# Patient Record
Sex: MALE | Race: WHITE | Employment: OTHER | URBAN - NONMETROPOLITAN AREA
[De-identification: names, ages, dates, MRNs, and addresses within clinical notes are randomized per-mention and may not be internally consistent; named-entity substitution may affect disease eponyms.]

---

## 2021-09-07 ENCOUNTER — HOSPITAL ENCOUNTER (EMERGENCY)
Age: 36
Discharge: HOME | End: 2021-09-07
Payer: MEDICARE

## 2021-09-07 VITALS
RESPIRATION RATE: 17 BRPM | DIASTOLIC BLOOD PRESSURE: 82 MMHG | WEIGHT: 150 LBS | TEMPERATURE: 98.2 F | HEART RATE: 91 BPM | SYSTOLIC BLOOD PRESSURE: 138 MMHG | OXYGEN SATURATION: 99 % | BODY MASS INDEX: 22.22 KG/M2 | HEIGHT: 69 IN

## 2021-09-07 DIAGNOSIS — F25.1 SCHIZOAFFECTIVE DISORDER, DEPRESSIVE TYPE (HCC): Primary | ICD-10-CM

## 2021-09-07 LAB
ACETAMINOPHEN LEVEL: < 5 UG/ML (ref 0–20)
ALBUMIN SERPL-MCNC: 5.2 G/DL (ref 3.5–5.1)
ALP BLD-CCNC: 84 U/L (ref 38–126)
ALT SERPL-CCNC: 29 U/L (ref 11–66)
AMPHETAMINE+METHAMPHETAMINE URINE SCREEN: NEGATIVE
ANION GAP SERPL CALCULATED.3IONS-SCNC: 10 MEQ/L (ref 8–16)
AST SERPL-CCNC: 36 U/L (ref 5–40)
BARBITURATE QUANTITATIVE URINE: NEGATIVE
BASOPHILS # BLD: 0.7 %
BASOPHILS ABSOLUTE: 0.1 THOU/MM3 (ref 0–0.1)
BENZODIAZEPINE QUANTITATIVE URINE: NEGATIVE
BILIRUB SERPL-MCNC: 0.6 MG/DL (ref 0.3–1.2)
BILIRUBIN DIRECT: < 0.2 MG/DL (ref 0–0.3)
BILIRUBIN URINE: NEGATIVE
BLOOD, URINE: NEGATIVE
BUN BLDV-MCNC: 17 MG/DL (ref 7–22)
CALCIUM SERPL-MCNC: 9.7 MG/DL (ref 8.5–10.5)
CANNABINOID QUANTITATIVE URINE: NEGATIVE
CHARACTER, URINE: CLEAR
CHLORIDE BLD-SCNC: 101 MEQ/L (ref 98–111)
CO2: 28 MEQ/L (ref 23–33)
COCAINE METABOLITE QUANTITATIVE URINE: NEGATIVE
COLOR: ABNORMAL
CREAT SERPL-MCNC: 1 MG/DL (ref 0.4–1.2)
EOSINOPHIL # BLD: 1.8 %
EOSINOPHILS ABSOLUTE: 0.2 THOU/MM3 (ref 0–0.4)
ERYTHROCYTE [DISTWIDTH] IN BLOOD BY AUTOMATED COUNT: 12.1 % (ref 11.5–14.5)
ERYTHROCYTE [DISTWIDTH] IN BLOOD BY AUTOMATED COUNT: 39.7 FL (ref 35–45)
ETHYL ALCOHOL, SERUM: < 0.01 %
GFR SERPL CREATININE-BSD FRML MDRD: 84 ML/MIN/1.73M2
GLUCOSE BLD-MCNC: 100 MG/DL (ref 70–108)
GLUCOSE URINE: NEGATIVE MG/DL
HCT VFR BLD CALC: 47.7 % (ref 42–52)
HEMOGLOBIN: 16.5 GM/DL (ref 14–18)
IMMATURE GRANS (ABS): 0.02 THOU/MM3 (ref 0–0.07)
IMMATURE GRANULOCYTES: 0.2 %
KETONES, URINE: 40
LEUKOCYTE ESTERASE, URINE: NEGATIVE
LYMPHOCYTES # BLD: 12.6 %
LYMPHOCYTES ABSOLUTE: 1.3 THOU/MM3 (ref 1–4.8)
MCH RBC QN AUTO: 31.1 PG (ref 26–33)
MCHC RBC AUTO-ENTMCNC: 34.6 GM/DL (ref 32.2–35.5)
MCV RBC AUTO: 89.8 FL (ref 80–94)
MONOCYTES # BLD: 5.3 %
MONOCYTES ABSOLUTE: 0.5 THOU/MM3 (ref 0.4–1.3)
NITRITE, URINE: NEGATIVE
NUCLEATED RED BLOOD CELLS: 0 /100 WBC
OPIATES, URINE: NEGATIVE
OSMOLALITY CALCULATION: 279.2 MOSMOL/KG (ref 275–300)
OXYCODONE: NEGATIVE
PH UA: 5 (ref 5–9)
PHENCYCLIDINE QUANTITATIVE URINE: NEGATIVE
PLATELET # BLD: 205 THOU/MM3 (ref 130–400)
PMV BLD AUTO: 10 FL (ref 9.4–12.4)
POTASSIUM SERPL-SCNC: 3.8 MEQ/L (ref 3.5–5.2)
PROTEIN UA: NEGATIVE
RBC # BLD: 5.31 MILL/MM3 (ref 4.7–6.1)
SALICYLATE, SERUM: < 0.3 MG/DL (ref 2–10)
SEG NEUTROPHILS: 79.4 %
SEGMENTED NEUTROPHILS ABSOLUTE COUNT: 7.9 THOU/MM3 (ref 1.8–7.7)
SODIUM BLD-SCNC: 139 MEQ/L (ref 135–145)
SPECIFIC GRAVITY, URINE: 1.02 (ref 1–1.03)
TOTAL PROTEIN: 7.9 G/DL (ref 6.1–8)
TSH SERPL DL<=0.05 MIU/L-ACNC: 2.9 UIU/ML (ref 0.4–4.2)
UROBILINOGEN, URINE: 0.2 EU/DL (ref 0–1)
WBC # BLD: 10 THOU/MM3 (ref 4.8–10.8)

## 2021-09-07 PROCEDURE — 80179 DRUG ASSAY SALICYLATE: CPT

## 2021-09-07 PROCEDURE — 80307 DRUG TEST PRSMV CHEM ANLYZR: CPT

## 2021-09-07 PROCEDURE — 80053 COMPREHEN METABOLIC PANEL: CPT

## 2021-09-07 PROCEDURE — 84443 ASSAY THYROID STIM HORMONE: CPT

## 2021-09-07 PROCEDURE — 82248 BILIRUBIN DIRECT: CPT

## 2021-09-07 PROCEDURE — 82077 ASSAY SPEC XCP UR&BREATH IA: CPT

## 2021-09-07 PROCEDURE — 85025 COMPLETE CBC W/AUTO DIFF WBC: CPT

## 2021-09-07 PROCEDURE — 99285 EMERGENCY DEPT VISIT HI MDM: CPT

## 2021-09-07 PROCEDURE — 36415 COLL VENOUS BLD VENIPUNCTURE: CPT

## 2021-09-07 PROCEDURE — 80143 DRUG ASSAY ACETAMINOPHEN: CPT

## 2021-09-07 PROCEDURE — 81003 URINALYSIS AUTO W/O SCOPE: CPT

## 2021-09-07 ASSESSMENT — SLEEP AND FATIGUE QUESTIONNAIRES
DIFFICULTY FALLING ASLEEP: YES
DIFFICULTY ARISING: NO
RESTFUL SLEEP: NO
DIFFICULTY STAYING ASLEEP: YES
DO YOU HAVE DIFFICULTY SLEEPING: YES
SLEEP PATTERN: INSOMNIA

## 2021-09-07 NOTE — PROGRESS NOTES
Provisional Diagnosis:    Schizoaffective Disorder    Risk, Psychosocial and Contextual Factors:      Current  Treatment:  Dr. Isa Kate      Present Suicidal Behavior:      Verbal:    Denies        Attempt:   Denies    Access to Weapons:   Denies    Current Suicide Risk: Low, Moderate or High:    Low    Past Suicidal Behavior:       Verbal:    Denies    Attempt:   Denies     Self-Injurious/Self-Mutilation:  Patient reports hitting chair on head 'years ago'    Traumatic Event Within Past 2 Weeks:   Denies    Current Abuse:   Denies     Legal:     Denies    Violence:    Denies    Protective Factors:    Good Support    Housing:       Lives in [facility]    CPAP/Oxygen/Ambulation Difficulties:     Basic Vital Signs Normal?: Check with Patients Nurse prior to Calling Psychiatry    Critical Labs?: Check with Patients Nurse prior to Calling Psychiatry    Clinical Summary:      Patient is a 39year old male who presents to the ED voluntarily. Patient reports 'I saw a demon above my bed'. Patient reports insomnia due to visual hallucinations. Patient reports he has had verbal and visual hallucinations his 'entire life'. Patient denies recent depression. Patient denies suicidal ideation or plan. Patient does follow up outpatient for treatment. Patient compliant with prescribed medication. Patient previously admitted to [facility] on 6/26/16. Homicidal thoughts and/or plans denied. No delusions noted. AOD denied. Patient alert and oriented x4. Patient cooperative throughout assessment. Level of Care Disposition:      Consulted with medical provider. Patient is medically cleared. No psych consult needed. Patient to be discharged to [facility].

## 2021-09-07 NOTE — ED NOTES
Bed: 027A  Expected date: 9/7/21  Expected time:   Means of arrival: [facility]  Comments:     Leonel Hughes  09/07/21 0227

## 2021-09-07 NOTE — ED NOTES
Pt to ED via EMS from [facility]. Per EMS pt has been non-compliant with their medications. Pt reports they have been Hallucinating and has \"demons\" in their head. Pt reports they are not sure what the demons are telling them. Pt denies feeling suicidal or homicidal. Pt VSS. Patient placed in safe room that is ligature resistant with continuous monitoring in place. Provider notified, requested an assessment by behavioral health . Patient belongings secured in a locked lockers outside of the room. Explained suicide prevention precautions to the patient including constant observer.       Owen Rodas RN  09/07/21 7053

## 2021-09-13 ASSESSMENT — ENCOUNTER SYMPTOMS
VOMITING: 0
BACK PAIN: 0
EYE REDNESS: 0
CHEST TIGHTNESS: 0
RHINORRHEA: 0
COUGH: 0
NAUSEA: 0
ABDOMINAL PAIN: 0

## 2021-09-13 NOTE — ED PROVIDER NOTES
[facility] Emergency Department    CHIEF COMPLAINT       Chief Complaint   Patient presents with    Psychiatric Evaluation    Hallucinations       Nurses Notes reviewed and I agree except as noted in the HPI. HISTORY OF PRESENT ILLNESS    Santana James manuel 39 y.o. male who presents to the ED for psychiatric evaluation. The patient has been having hallucinations. The patient resides at [facility].  They state that the patient has been questionably compliant with medications. He is hallucinating that there are demons in his head that tell him to do things but the patient denies feeling suicidal or homicidal.          HPI was provided by the patient    REVIEW OF SYSTEMS     Review of Systems   Constitutional: Negative for chills, fatigue and fever. HENT: Negative for congestion, ear discharge, ear pain, postnasal drip and rhinorrhea. Eyes: Negative for redness. Respiratory: Negative for cough and chest tightness. Cardiovascular: Negative for chest pain and leg swelling. Gastrointestinal: Negative for abdominal pain, nausea and vomiting. Genitourinary: Negative for difficulty urinating, dysuria, enuresis, flank pain and hematuria. Musculoskeletal: Negative for back pain and joint swelling. Skin: Negative for rash. Neurological: Negative for dizziness, light-headedness, numbness and headaches. Psychiatric/Behavioral: Positive for agitation, behavioral problems, hallucinations and sleep disturbance. Negative for confusion and suicidal ideas. All other systems negative except as noted. PAST MEDICAL HISTORY     Past Medical History:   Diagnosis Date    Depression     Hallucination     Paranoid behavior ([facility]     Schizoaffective disorder ([facility]        SURGICALHISTORY      has a past surgical history that includes Nose surgery and orthopedic surgery.     CURRENT MEDICATIONS       Discharge Medication List as of 9/7/2021  4:13 AM      CONTINUE these medications which have NOT CHANGED    Details   lurasidone (LATUDA) 80 MG TABS tablet Take 1 tablet by mouth 2 times daily, Disp-30 tablet, R-1      Mouthwashes (BIOTENE/CALCIUM PBF) LIQD Take 15 mLs by mouth 3 times daily as needed (dry mouth), Disp-1 Bottle, R-1      traZODone (DESYREL) 50 MG tablet Take 1 tablet by mouth nightly as needed for Sleep, Disp-30 tablet, R-0      Multiple Vitamins-Minerals (THERAPEUTIC MULTIVITAMIN-MINERALS) tablet Take 1 tablet by mouth daily      LORazepam (ATIVAN) 0.5 MG tablet Take 1 mg by mouth 2 times daily as needed for Anxiety       benztropine (COGENTIN) 2 MG tablet Take 2 mg by mouth 2 times daily Indications: Take with Latuda      PARoxetine HCl (PAXIL PO) Take  by mouth. Iloperidone (FANAPT PO) Take  by mouth. ALLERGIES     is allergic to zyprexa [olanzapine]. FAMILY HISTORY     He indicated that his mother is alive. He indicated that his father is alive. He indicated that the status of his maternal grandmother is unknown. He indicated that the status of his maternal grandfather is unknown. He indicated that the status of his paternal grandmother is unknown. He indicated that the status of his paternal grandfather is unknown.   family history includes Cancer in his maternal grandmother and paternal grandmother; Depression in his maternal grandmother; Heart Disease in his paternal grandfather; High Cholesterol in his maternal grandfather.     SOCIAL HISTORY       Social History     Socioeconomic History    Marital status: Single     Spouse name: Not on file    Number of children: Not on file    Years of education: Not on file    Highest education level: Not on file   Occupational History    Not on file   Tobacco Use    Smoking status: Former Smoker     Quit date: 2003     Years since quittin.5    Smokeless tobacco: Never Used   Vaping Use    Vaping Use: Never used   Substance and Sexual Activity    Alcohol use: No    Drug use: No    Sexual activity: Not Currently   Other Topics Concern    Not on file   Social History Narrative    ** Merged History Encounter **          Social Determinants of Health     Financial Resource Strain:     Difficulty of Paying Living Expenses:    Food Insecurity:     Worried About Running Out of Food in the Last Year:     Ran Out of Food in the Last Year:    Transportation Needs:     Lack of Transportation (Medical):  Lack of Transportation (Non-Medical):    Physical Activity:     Days of Exercise per Week:     Minutes of Exercise per Session:    Stress:     Feeling of Stress :    Social Connections:     Frequency of Communication with Friends and Family:     Frequency of Social Gatherings with Friends and Family:     Attends [employer] Services:     Active Member of Clubs or Organizations:     Attends Club or Organization Meetings:     Marital Status:    Intimate Partner Violence:     Fear of Current or Ex-Partner:     Emotionally Abused:     Physically Abused:     Sexually Abused:        PHYSICAL EXAM     INITIAL VITALS:  height is 5' 9\" (1.753 m) and weight is 150 lb (68 kg). His oral temperature is 98.2 °F (36.8 °C). His blood pressure is 138/82 and his pulse is 91. His respiration is 17 and oxygen saturation is 99%. Physical Exam  Constitutional:       Appearance: Normal appearance. He is well-developed. He is not ill-appearing. HENT:      Head: Normocephalic and atraumatic. Nose: Nose normal.      Mouth/Throat:      Mouth: Mucous membranes are moist.      Pharynx: Oropharynx is clear. Eyes:      Conjunctiva/sclera: Conjunctivae normal.   Cardiovascular:      Rate and Rhythm: Normal rate. Pulses: Normal pulses. Pulmonary:      Effort: Pulmonary effort is normal.   Abdominal:      Palpations: Abdomen is soft. Musculoskeletal:         General: Normal range of motion. Cervical back: Normal range of motion. Skin:     General: Skin is warm and dry.       Capillary Refill: Capillary refill METABOLIC PANEL   ETHANOL   TSH WITHOUT REFLEX   URINE DRUG SCREEN   ANION GAP   OSMOLALITY       EMERGENCY DEPARTMENT COURSE:   Vitals:    Vitals:    09/07/21 0233   BP: 138/82   Pulse: 91   Resp: 17   Temp: 98.2 °F (36.8 °C)   TempSrc: Oral   SpO2: 99%   Weight: 150 lb (68 kg)   Height: 5' 9\" (1.753 m)            Plan:    Order the following: Labs for medical clearance  Medications: none  Goal: Dispo per psych                         MDM    The patient was seen and evaluated within the ED today for the evaluation of hallucinations. Physical exam revealed no significant abnormalities or concerns. I completed a medical evaluation of the patient and ordered appropriate labs which were unremarkable. [facility] and social work completed a full psychiatric evaluation of the patient and determined that he met  discharge criteria. I medically cleared the patient. [facility] and social work's noted should be consulted for the psychiatric evaluation and reason for discharge. Medications - No data to display      Patient was seen independently by myself. The patient's final impression and disposition and plan was determined by myself. Strict return precautions and follow up instructions were discussed with the patient prior to discharge, with which the patient agrees. Physical assessment findings, diagnostic testing(s) if applicable, and vital signs reviewed with patient/patient representative. Questions answered. Medications asdirected, including OTC medications for supportive care. Education provided on medications. Differential diagnosis(s) discussed with patient/patient representative. Home care/self care instructions reviewed withpatient/patient representative. Patient is to follow-up with family care provider in 2-3 days if no improvement. Patient is to go to the emergency department if symptoms worsen.   Patient/patient representative isaware of care plan, questions answered, verbalizes understanding and is in

## 2022-02-02 ENCOUNTER — HOSPITAL ENCOUNTER (INPATIENT)
Age: 37
LOS: 7 days | Discharge: HOME | DRG: 885 | End: 2022-02-11
Attending: EMERGENCY MEDICINE | Admitting: PSYCHIATRY & NEUROLOGY
Payer: MEDICARE

## 2022-02-02 DIAGNOSIS — F25.1 SCHIZOAFFECTIVE DISORDER, DEPRESSIVE TYPE (HCC): Primary | ICD-10-CM

## 2022-02-02 LAB
ACETAMINOPHEN LEVEL: < 5 UG/ML (ref 0–20)
AMPHETAMINE+METHAMPHETAMINE URINE SCREEN: NEGATIVE
ANION GAP SERPL CALCULATED.3IONS-SCNC: 12 MEQ/L (ref 8–16)
BARBITURATE QUANTITATIVE URINE: NEGATIVE
BASOPHILS # BLD: 1.3 %
BASOPHILS ABSOLUTE: 0.1 THOU/MM3 (ref 0–0.1)
BENZODIAZEPINE QUANTITATIVE URINE: NEGATIVE
BUN BLDV-MCNC: 21 MG/DL (ref 7–22)
CALCIUM SERPL-MCNC: 9.3 MG/DL (ref 8.5–10.5)
CANNABINOID QUANTITATIVE URINE: NEGATIVE
CHLORIDE BLD-SCNC: 100 MEQ/L (ref 98–111)
CO2: 26 MEQ/L (ref 23–33)
COCAINE METABOLITE QUANTITATIVE URINE: NEGATIVE
CREAT SERPL-MCNC: 0.9 MG/DL (ref 0.4–1.2)
EOSINOPHIL # BLD: 3.3 %
EOSINOPHILS ABSOLUTE: 0.2 THOU/MM3 (ref 0–0.4)
ERYTHROCYTE [DISTWIDTH] IN BLOOD BY AUTOMATED COUNT: 12.3 % (ref 11.5–14.5)
ERYTHROCYTE [DISTWIDTH] IN BLOOD BY AUTOMATED COUNT: 40.1 FL (ref 35–45)
ETHYL ALCOHOL, SERUM: < 0.01 %
GFR SERPL CREATININE-BSD FRML MDRD: > 90 ML/MIN/1.73M2
GLUCOSE BLD-MCNC: 114 MG/DL (ref 70–108)
HCT VFR BLD CALC: 43.2 % (ref 42–52)
HEMOGLOBIN: 15 GM/DL (ref 14–18)
IMMATURE GRANS (ABS): 0.02 THOU/MM3 (ref 0–0.07)
IMMATURE GRANULOCYTES: 0.3 %
LYMPHOCYTES # BLD: 16.1 %
LYMPHOCYTES ABSOLUTE: 1 THOU/MM3 (ref 1–4.8)
MCH RBC QN AUTO: 31.3 PG (ref 26–33)
MCHC RBC AUTO-ENTMCNC: 34.7 GM/DL (ref 32.2–35.5)
MCV RBC AUTO: 90.2 FL (ref 80–94)
MONOCYTES # BLD: 7.7 %
MONOCYTES ABSOLUTE: 0.5 THOU/MM3 (ref 0.4–1.3)
NUCLEATED RED BLOOD CELLS: 0 /100 WBC
OPIATES, URINE: NEGATIVE
OSMOLALITY CALCULATION: 279.5 MOSMOL/KG (ref 275–300)
OXYCODONE: NEGATIVE
PHENCYCLIDINE QUANTITATIVE URINE: NEGATIVE
PLATELET # BLD: 210 THOU/MM3 (ref 130–400)
PMV BLD AUTO: 9.9 FL (ref 9.4–12.4)
POTASSIUM REFLEX MAGNESIUM: 4.1 MEQ/L (ref 3.5–5.2)
RBC # BLD: 4.79 MILL/MM3 (ref 4.7–6.1)
SALICYLATE, SERUM: < 0.3 MG/DL (ref 2–10)
SARS-COV-2, NAAT: NOT  DETECTED
SEG NEUTROPHILS: 71.3 %
SEGMENTED NEUTROPHILS ABSOLUTE COUNT: 4.6 THOU/MM3 (ref 1.8–7.7)
SODIUM BLD-SCNC: 138 MEQ/L (ref 135–145)
WBC # BLD: 6.4 THOU/MM3 (ref 4.8–10.8)

## 2022-02-02 PROCEDURE — 82077 ASSAY SPEC XCP UR&BREATH IA: CPT

## 2022-02-02 PROCEDURE — 87635 SARS-COV-2 COVID-19 AMP PRB: CPT

## 2022-02-02 PROCEDURE — 80048 BASIC METABOLIC PNL TOTAL CA: CPT

## 2022-02-02 PROCEDURE — 80143 DRUG ASSAY ACETAMINOPHEN: CPT

## 2022-02-02 PROCEDURE — 85025 COMPLETE CBC W/AUTO DIFF WBC: CPT

## 2022-02-02 PROCEDURE — 80307 DRUG TEST PRSMV CHEM ANLYZR: CPT

## 2022-02-02 PROCEDURE — 36415 COLL VENOUS BLD VENIPUNCTURE: CPT

## 2022-02-02 PROCEDURE — 99285 EMERGENCY DEPT VISIT HI MDM: CPT

## 2022-02-02 PROCEDURE — 80179 DRUG ASSAY SALICYLATE: CPT

## 2022-02-02 ASSESSMENT — SLEEP AND FATIGUE QUESTIONNAIRES
DIFFICULTY STAYING ASLEEP: YES
DO YOU HAVE DIFFICULTY SLEEPING: YES
RESTFUL SLEEP: NO
DIFFICULTY FALLING ASLEEP: YES
AVERAGE NUMBER OF SLEEP HOURS: 5
SLEEP PATTERN: DIFFICULTY FALLING ASLEEP;DISTURBED/INTERRUPTED SLEEP;INSOMNIA
DO YOU USE A SLEEP AID: YES
DIFFICULTY ARISING: NO

## 2022-02-02 ASSESSMENT — PATIENT HEALTH QUESTIONNAIRE - PHQ9: SUM OF ALL RESPONSES TO PHQ QUESTIONS 1-9: 17

## 2022-02-02 NOTE — ED TRIAGE NOTES
Pt presents to the ED via [facility] for a probate. Pt states that he feels like he is being controlled by a demon. Pt states he has felt this way since he was 11. Pt asking for genetic testing. Pt is cooperative. Pt denies suicidal and homicidal thoughts.

## 2022-02-02 NOTE — ED PROVIDER NOTES
[facility]  Emergency Department  Faculty Sign-Out Addendum     Care of Alfredo Acuna was assumed from previous attending and is being seen for Other (probate)  . The patient's initial evaluation and plan have been discussed with the prior provider who initially evaluated the patient. EMERGENCY DEPARTMENT COURSE / MEDICAL DECISION MAKING       MEDICATIONS GIVEN:  No orders of the defined types were placed in this encounter. LABS / RADIOLOGY:     Labs Reviewed   BASIC METABOLIC PANEL W/ REFLEX TO MG FOR LOW K - Abnormal; Notable for the following components:       Result Value    Glucose 114 (*)     All other components within normal limits   SALICYLATE LEVEL - Abnormal; Notable for the following components:    Salicylate, Serum < 0.3 (*)     All other components within normal limits   COVID-19, RAPID   CBC WITH AUTO DIFFERENTIAL   ETHANOL   ACETAMINOPHEN LEVEL   URINE DRUG SCREEN   ANION GAP   GLOMERULAR FILTRATION RATE, ESTIMATED   OSMOLALITY       No results found. RECENT VITALS:     Temp: 98.5 °F (36.9 °C),  Pulse: 87, Resp: 18, BP: (!) 150/108, SpO2: 100 %    This patient is a 44 y.o. Male with acute psychosis awaiting psychiatric placement. OUTSTANDING TASKS / RECOMMENDATIONS    1. Awaiting [facility] evaluation as patient was probated. FINAL DISPOSITION AND ED COURSE     On my examination, resting comfortably no acute distress    HEENT: WNL  Lungs: Clear and equal bilaterally. No increased work of breathing or respiratory distress. Heart: Rate and rhythm regular, no murmurs clicks or gallops  Abdomen: Soft, nondistended, nontender   Lower extremities: no edema, negative Homans bilaterally  Neuro: Awake and alert, no lateralizing deficits, cranial nerves II through XII grossly intact bilaterally    ED COURSE   ED Course as of 02/04/22 1426   Wed Feb 02, 2022   1834 The patient was seen and evaluated within the ED today for the evaluation of psychosis.  He was previously evaluated by my colleague Dr. Zack Rangel. On my assessment his physical exam revealed no significant abnormalities or concerns. I completed a medical evaluation of the patient and ordered appropriate labs which were unremarkable. [facility] completed a full psychiatric evaluation of the patient and determined that he met inpatient criteria. There are no current contraindications for psychiatric disposition. [DD]   u Feb 03, 2022   2307 Patient sign-out to me per my colleague. He is awaiting transport to psych facility  [DD]   Fri Feb 04, 2022   0700 No interventions required Overnight. Patient signed out to my colleague Dr. Zack Rangel. [DD]      ED Course User Index  [DD] Nellene Kayser, DO         Clinical Impression    No diagnosis found. Psychosis  FINAL DISPOSITION    DISPOSITION    Pending at signout to my colleague at shift change. PATIENT REFERRED TO:  No follow-up provider specified.     DISCHARGE MEDICATIONS:  New Prescriptions    No medications on file              (Please note that portions of this note were completed with a voice recognition program.  Efforts were made to edit the dictations but occasionally words are mis-transcribed.)      [address] [place] [address]  Attending Emergency Physician  [address] [facility] [address] [address] EMERGENCY DEPT      Nellene Kayser, DO  02/02/22 [address] [facility], DO  02/04/22 [facility]6

## 2022-02-02 NOTE — ED NOTES
Bed: 022A  Expected date:   Expected time:   Means of arrival:   Comments:     Bandar Mccarthy RN  02/02/22 6740

## 2022-02-02 NOTE — PROGRESS NOTES
Call to [facility], spoke with Arina Figueroa. Dorie Wild states assessment was already completed while patient was at [facility]. Caryl placed call to [facility], 12 bed waiting list. Dorie Wild will fax completed assessment to [facility]. [facility] to complete full assessment for psychiatry.

## 2022-02-02 NOTE — ED NOTES
Pt resting on cot.  ED sitter at bedside     Guero Francis, [address] [facility]  02/02/22 3574

## 2022-02-02 NOTE — ED PROVIDER NOTES
[facility] Adult Psych 4E      CHIEF COMPLAINT       Chief Complaint   Patient presents with    Other     probate       Nurses Notes reviewed and I agree except as noted in the HPI. HISTORY OF PRESENT ILLNESS    Loc Sanches is a 44 y.o. male who presents with complaint of schizophrenia, not taking his medications. Patient brought in by [facility] office as probate, patient is medically cleared and evaluated by [facility] psychiatry. Others patient has no complaints, is aware that he is not taking his medications, reporting auditory hallucinations. REVIEW OF SYSTEMS      Review of Systems   Constitutional: Negative for fever, chills, diaphoresis and fatigue. HENT: Negative for congestion, drooling, facial swelling and sore throat. Eyes: Negative for photophobia, pain and discharge. Respiratory: Negative for cough, shortness of breath, wheezing and stridor. Cardiovascular: Negative for chest pain, palpitations and leg swelling. Gastrointestinal: Negative for abdominal pain, blood in stool and abdominal distention. Genitourinary: Negative for dysuria, urgency, hematuria and difficulty urinating. Musculoskeletal: Negative for gait problem, neck pain and neck stiffness. Allergic/Immunologic: Negative for environmental allergies, food allergies and immunocompromised state. Skin; No rash, No itching  Neurological: Negative for seizures, weakness and numbness. Psychiatric/Behavioral:Pos for hallucinations, confusion and agitation. PAST MEDICAL HISTORY    has a past medical history of Depression, Hallucination, Paranoid behavior ([address] and Schizoaffective disorder ([address] SURGICAL HISTORY      has a past surgical history that includes Nose surgery and orthopedic surgery.     CURRENT MEDICATIONS       Discharge Medication List as of 2/11/2022  1:46 PM      CONTINUE these medications which have NOT CHANGED    Details   Mouthwashes (BIOTENE/CALCIUM PBF) LIQD Take 15 mLs by mouth 3 times daily as needed (dry mouth), Disp-1 Bottle, R-1      Multiple Vitamins-Minerals (THERAPEUTIC MULTIVITAMIN-MINERALS) tablet Take 1 tablet by mouth daily             ALLERGIES     is allergic to zyprexa [olanzapine]. FAMILY HISTORY     He indicated that his mother is alive. He indicated that his father is alive. He indicated that the status of his maternal grandmother is unknown. He indicated that the status of his maternal grandfather is unknown. He indicated that the status of his paternal grandmother is unknown. He indicated that the status of his paternal grandfather is unknown.   family history includes Cancer in his maternal grandmother and paternal grandmother; Depression in his maternal grandmother; Heart Disease in his paternal grandfather; High Cholesterol in his maternal grandfather. SOCIAL HISTORY      reports that he quit smoking about 18 years ago. He has never used smokeless tobacco. He reports that he does not drink alcohol and does not use drugs. PHYSICAL EXAM     INITIAL VITALS:  height is 5' 9\" (1.753 m) and weight is 150 lb (68 kg). His tympanic temperature is 97.7 °F (36.5 °C). His blood pressure is 131/82 and his pulse is 89. His respiration is 16 and oxygen saturation is 96%. Physical Exam   Constitutional:  well-developed and well-nourished. HENT: Head: Normocephalic, atraumatic, Bilateral external ears normal, Oropharynx mosit, No oral exudates, Nose normal.   Eyes: PERRL, EOMI, Conjunctiva normal, No discharge. No scleral icterus  Neck: Normal range of motion, No tenderness, Supple  Cardiovascular: Normal rate, regular rhythm, S1 normal and S2 normal.  Exam reveals no gallop. Pulmonary/Chest: Effort normal and breath sounds normal. No accessory muscle usage or stridor. No respiratory distress. no wheezes. has no rales. exhibits no tenderness. Abdominal: Soft. Bowel sounds are normal.  exhibits no distension. There is no tenderness. There is no rebound and no guarding. Extremities: No edema, no tenderness, no cyanosis, no clubbing. Musculoskeletal: Good range of motion in major joints is observed. No major deformities noted. Neurological: Alert and oriented ×3, normal motor function, normal sensory function, no focal deficits. GCS 15  Skin: Skin is warm, dry and intact. No rash noted. No erythema. Psychiatric: Affect normal, judgment is abnormal, mood normal.  DIFFERENTIAL DIAGNOSIS:       DIAGNOSTIC RESULTS     EKG: All EKG's are interpreted by the Emergency Department Physician who either signs or Co-signs this chart in the absence of a cardiologist.      RADIOLOGY: non-plain film images(s) such as CT, Ultrasound and MRI are read by the radiologist.  Plain radiographic images are visualized and preliminarily interpreted by the emergency physician unless otherwise stated below.       LABS:   Labs Reviewed   BASIC METABOLIC PANEL W/ REFLEX TO MG FOR LOW K - Abnormal; Notable for the following components:       Result Value    Glucose 114 (*)     All other components within normal limits   SALICYLATE LEVEL - Abnormal; Notable for the following components:    Salicylate, Serum < 0.3 (*)     All other components within normal limits   LITHIUM LEVEL - Abnormal; Notable for the following components:    Lithium Lvl 0.10 (*)     All other components within normal limits   VALPROIC ACID LEVEL, TOTAL - Abnormal; Notable for the following components:    Valproic Acid Lvl < 2.8 (*)     All other components within normal limits   COVID-19, RAPID   CBC WITH AUTO DIFFERENTIAL   ETHANOL   ACETAMINOPHEN LEVEL   URINE DRUG SCREEN   ANION GAP   GLOMERULAR FILTRATION RATE, ESTIMATED   OSMOLALITY   CARBAMAZEPINE LEVEL, TOTAL   TSH WITHOUT REFLEX   T4, FREE       EMERGENCY DEPARTMENT COURSE:   Vitals:    Vitals:    02/09/22 1935 02/10/22 0733 02/10/22 1928 02/11/22 0900   BP: 122/75 115/76 116/84 131/82   Pulse: 84 82 89 89   Resp: 18 18 16 16   Temp: 97 °F (36.1 °C) 96.5 °F (35.8 °C) 98.2 °F (36.8 °C) 97.7 °F (36.5 °C)   TempSrc: Tympanic Tympanic Tympanic Tympanic   SpO2: 97% 99% 96% 96%   Weight:       Height:         Patient presenting with complaint of auditory hallucinations, history of schizophrenia, noncompliant with medications. Patient medically cleared, he is to be evaluated by [facility] psychiatry. CRITICAL CARE:       CONSULTS:  None    PROCEDURES:  None    FINAL IMPRESSION      1. Schizoaffective disorder, depressive type [facility])          DISPOSITION/PLAN   Admitted    PATIENT REFERRED TO:  [facility]  [address]    On 2/15/2022  Reida Bartlett is scheduled with a follow up, by phone, with Dr. Anupama Baird on 2/15/22 at 2:40 pm.       DISCHARGE MEDICATIONS:  Discharge Medication List as of 2/11/2022  1:46 PM      START taking these medications    Details   cariprazine hcl (VRAYLAR) 6 MG CAPS capsule Take 1 capsule by mouth daily, Disp-30 capsule, R-0Normal             (Please note that portions of this note were completed with a voice recognition program.  Efforts were made to edit the dictations but occasionally words are mis-transcribed.)    DO Daryn Cao DO  02/14/22 8967

## 2022-02-03 PROCEDURE — 6370000000 HC RX 637 (ALT 250 FOR IP)

## 2022-02-03 PROCEDURE — 6370000000 HC RX 637 (ALT 250 FOR IP): Performed by: EMERGENCY MEDICINE

## 2022-02-03 RX ORDER — LORAZEPAM 1 MG/1
1 TABLET ORAL ONCE
Status: COMPLETED | OUTPATIENT
Start: 2022-02-03 | End: 2022-02-03

## 2022-02-03 RX ORDER — LORAZEPAM 1 MG/1
TABLET ORAL
Status: COMPLETED
Start: 2022-02-03 | End: 2022-02-03

## 2022-02-03 RX ORDER — LANOLIN ALCOHOL/MO/W.PET/CERES
4.5 CREAM (GRAM) TOPICAL NIGHTLY PRN
Status: DISCONTINUED | OUTPATIENT
Start: 2022-02-03 | End: 2022-02-04

## 2022-02-03 RX ADMIN — Medication 4.5 MG: at 01:04

## 2022-02-03 RX ADMIN — LORAZEPAM 1 MG: 1 TABLET ORAL at 15:54

## 2022-02-03 ASSESSMENT — PAIN DESCRIPTION - LOCATION: LOCATION: ARM

## 2022-02-03 ASSESSMENT — PAIN SCALES - GENERAL: PAINLEVEL_OUTOF10: 6

## 2022-02-03 ASSESSMENT — PAIN DESCRIPTION - ORIENTATION: ORIENTATION: RIGHT;LEFT

## 2022-02-03 ASSESSMENT — PAIN DESCRIPTION - PAIN TYPE: TYPE: CHRONIC PAIN

## 2022-02-03 NOTE — ED NOTES
Pt is resting in cot with respirations even and unlabored. Pt is reading a bible. Pt states he would like to take something to help him sleep. Pt voices no concern or need at this time. Call light is within reach. Safety sitter remains at bedside to ensure pt safety. Will continue to monitor.        Marcio Montano RN  02/03/22 0002

## 2022-02-03 NOTE — ED NOTES
ED nurse-to-nurse bedside report    Chief Complaint   Patient presents with    Other     probate      LOC: alert and orientated to name, place, date  Vital signs   Vitals:    02/02/22 1449 02/02/22 1918 02/02/22 2322 02/03/22 0339   BP: (!) 150/108 (!) 140/89 (!) 124/98 (!) 124/90   Pulse: 87 67 71 70   Resp: 18 17 18 18   Temp: 98.5 °F (36.9 °C) 98 °F (36.7 °C) 98 °F (36.7 °C) 98.1 °F (36.7 °C)   TempSrc:  Oral Oral    SpO2: 100% 98% 100% 99%   Weight:  150 lb (68 kg)        Pain:    Pain Interventions: not applicable   Pain Goal: 0  Oxygen: No    Current needs required room air   Telemetry: No  LDAs:    Continuous Infusions:   Mobility: Independent  [place] Fall Risk Score: No flowsheet data found.   Fall Interventions: pt independent  Report given to: Riaz Ndiaye RN  02/03/22 2198

## 2022-02-03 NOTE — ED NOTES
RN gave pt some apple juice at this time. Sitter remains at bedside to ensure pt safety. Will continue to monitor.       Denise Hickman RN  02/02/22 9585

## 2022-02-03 NOTE — PROGRESS NOTES
Call to [facility]. Updated on patient status,  will not transport patient until tomorrow due to weather. [facility] to contact [facility] with update.

## 2022-02-03 NOTE — ED NOTES
Pt is resting in cot with respirations even and unlabored. Pt asking about melatonin, rn informed pt that we are waiting for pharmacy to send it. RN gave pt a warm blanket and turned lights off for comfort. Sitter remains at bedside to ensure pt safety. Will continue to monitor.       Robby Lux RN  02/03/22 4712

## 2022-02-03 NOTE — ED NOTES
Pt is resting in cot with respirations even and unlabored. RN gave pt some orange juice at this time. Safety sitter remains at bedside with respirations even and unlabored. Sitter remains at bedside. Will continue to monitor.       Juancarlos Dempsey RN  02/03/22 4495

## 2022-02-03 NOTE — ED PROVIDER NOTES
Reeval Note for 2/3/22 at 653p    Patient doing well. He denies any new complaints. He reported anxiety earlier and was given ativan. Pt resting comfortably, reports he feels ok, vital signs stable, denies any new concerns or questions. Transfer to [facility] pending.       Chris Antonio MD  02/03/22 6323

## 2022-02-03 NOTE — ED NOTES
Pt is resting in cot with respirations even and unlabored. Pt is states his is hearing voices through his mouth and it is making him say things he does not want to say. Pt states he was going to pray about if he wants to take an anti psych med at this time. RN told pt to let this RN know if he would like to take something for the voices and RN will ask provider.       Sean Wakefield RN  02/02/22 0261

## 2022-02-03 NOTE — ED NOTES
RN medicated pt per [facility]. Pt is resting in cot with respirations even and unlabored. Lights are off for pt comfort. Sitter at bedside to ensure pt safety. Will continue to monitor.       Emilee Gibson RN  02/03/22 4158

## 2022-02-03 NOTE — ED NOTES
Pt is resting in room, reading the bible at this time. No distress noted. Sitter remains at bedside. Will continue to monitor.       [place], RN  02/03/22 1460

## 2022-02-03 NOTE — ED NOTES
Pt was requesting some orange juice, RN provided pt with some orange juice at this time. Pt is calm and cooperative. Sitter remains at bedside. Will continue to monitor.       Ginger Santillan RN  02/02/22 2029

## 2022-02-03 NOTE — ED NOTES
Pt is resting in cot with respirations even and unlabored. Pt states there are demons talking to him wanting him to do bad things. Pt denies wanting to hurt himself or other people. Pt also states he has received the mercy of God. When RN asked if there is anything this RN could do, pt denies any need or concern. Call light is within reach, sitter remains at bedside to ensure pt safety. Will continue to monitor.       Mariel Velásquez, ROSLYN  02/02/22 1338

## 2022-02-03 NOTE — PROGRESS NOTES
56: Spoke with Dr. Ashli Hodgson. Dr. Ashli Hodgson requests that Dr. Massimo Fernandez be contacted for a potential temporary admission to 4E until the patient is able to be transferred to [facility].    0944: VM left with Dr. Massimo Fernandez. 0945: Phone call to Jim Holloway advises that because the patient has already been accepted at [facility] an admit to 4E would not be appropriate. The patient will need to be seen by psychiatry before the 24 hour eval. This is the responsibility of Aundrea Saavedra and Aundrea Saavedra is to be contacted to advise on how to proceed with this situation. 9541: Phone call from Dr. Massimo Fernandez. Dr. Massimo Fernandez declines to temporarily admit patient to 4E. States will see what he can do in regards to seeing patient for 24 hour eval.    0950: Phone call to Aundrea Saavedra. Caryl to call back with update. 1111: Phone call from Abel Miller. Aundrea Saavedra will not be to see patient as they do not have a 24 hour evaluation protocol. At this time they will be continuing with current plan of care. 1118: [facility] updated. 1120: Attempted to contact Dr. Massimo Fernandez to update on need for 24 hour eval. Will re-attempt. 1130: Return call from Dr. Massimo Fernandez. Will be to see patient in 20-40 minutes. 1215: Dr. Massimo Fernandez in to see patient. 1307: Phone call to [facility] to inquire on process and potential update on a transfer time tomorrow. Instructed to call back tomorrow after 0800 for an update on transfer availability \"as previously advised\".

## 2022-02-03 NOTE — ED NOTES
Pt reports she is feeling \"salvation\" at this time. Pt reports he was talking to Demkaran in the room. pt requesting CBD oil to help him relax. Pt offered ativan which he is prescribed. Pt okay with ativan.  [facility] called per pt request no answer at this timeDr. Trevin made aware      Desire Trujillo RN  02/03/22 1749

## 2022-02-03 NOTE — PROGRESS NOTES
Patient is a probate. [facility] is seeking placement. 23:30 Placed 2 calls to [facility] for update on plan of care. No answer at this time. 00:15 Updated Dr. Ambrocio Real on patient status. 01:17 Placed call to [facility] for update. Per Trish phone call was made to ER at 23:49 with accepting information. Patient is accepted to the care of Dr. Mamadou Grace at [facility] for Psychiatry. Patient will have bed assignment upon arrival. Nurse to Nurse number is [phone] Option # 2. Patients primary ROSLYN Mckeon and Dr. Ambrocio Real updated on plan of care. ORSLYN Mckeon updated that patient needs original probate paperwork and transported by [facility].   01:35 [facility] updated on plan of care, earliest arrival time to [facility]  is 07:00 AM, need to transport by [facility]. ROSLYN Mckeon reports [facility] Does not have staffing tonight to transport. Will need to contact 's Dept after 08:30 AM. ROSLYN Mckeon has probate paperwork.    Handover to 1st shift Clinician Kaveh

## 2022-02-03 NOTE — ED NOTES
Patient resting in bed. Respirations easy and unlabored. No distress noted. Call light within reach. Sitter at bedside.       Sukhjinder Orr RN  02/03/22 8919

## 2022-02-03 NOTE — ED NOTES
Patient resting in bed. Respirations easy and unlabored. No distress noted. Call light within reach.  Sitter at bedside     Lobito Ackerman RN  02/03/22 5674

## 2022-02-03 NOTE — ED NOTES
Patient alert in bed. Respirations easy and unlabored. Patient stating he needs to see a neurologist. Respirations easy and unlabored. Lunch ordered for patient. Patient continuously monitored.       Inessa Ortez RN  02/03/22 5625

## 2022-02-03 NOTE — ED NOTES
Upon first contact with patient this RN receives bedside shift report from [facility]. Patient alert and oriented. Respirations easy and unlabored. Sitter at bedside.       Asha Lovell RN  02/03/22 2032

## 2022-02-03 NOTE — ED NOTES
Patient resting in bed. Respirations easy and unlabored. No distress noted. Call light within reach. Sitter at bedside.       Ash Zamora RN  02/03/22 0835

## 2022-02-03 NOTE — ED NOTES
RN attempted to call report to [facility] for Psychiatry using several different numbers at this time, however RN unable to get a hold of receiving RN at this time.        Jacqlyn File, RN  02/03/22 9619

## 2022-02-03 NOTE — ED NOTES
ED nurse-to-nurse bedside report    Chief Complaint   Patient presents with    Other     probate      LOC: alert and orientated to name, place, date  Vital signs   Vitals:    02/02/22 1918 02/02/22 2322 02/03/22 0339 02/03/22 1108   BP: (!) 140/89 (!) 124/98 (!) 124/90 (!) 124/93   Pulse: 67 71 70 75   Resp: 17 18 18 16   Temp: 98 °F (36.7 °C) 98 °F (36.7 °C) 98.1 °F (36.7 °C) 98.5 °F (36.9 °C)   TempSrc: Oral Oral  Oral   SpO2: 98% 100% 99% 98%   Weight: 150 lb (68 kg)         Pain:    Pain Interventions: none  Pain Goal: 0  Oxygen: No    Current needs required 0   Telemetry: No  LDAs:    Continuous Infusions:   Mobility: Independent  G[place]ay Fall Risk Score: No flowsheet data found.   Report given to: Shannon Otto RN  02/03/22 5465

## 2022-02-03 NOTE — ED NOTES
Pt ambulating around bed. Pt reports frustration with debris on the floor. Pt ate 100% of lunch tray.  Will monitor      Tigre Orourke RN  02/03/22 1489

## 2022-02-03 NOTE — ED NOTES
Pt is resting in cot with respirations even and unlabored, no distress noted. Sitter at bedside. Will continue to monitor.       Juancarlos Dempsey RN  02/03/22 4586

## 2022-02-03 NOTE — ED NOTES
RN gave report to Joanne Vallejo at [facility] for psychiatry at this time.         Nanda Boyle, [facility]  02/03/22 0021

## 2022-02-04 PROBLEM — F20.9 SCHIZOPHRENIA (HCC): Chronic | Status: ACTIVE | Noted: 2022-02-04

## 2022-02-04 PROBLEM — F23 SCHIZOPHRENIA, ACUTE (HCC): Status: ACTIVE | Noted: 2022-02-04

## 2022-02-04 LAB
CARBAMAZEPINE, TOTAL: < 2 MCG/ML (ref 2–10)
LITHIUM LEVEL: 0.1 MMOL/L (ref 0.6–1.2)
T4 FREE: 1.19 NG/DL (ref 0.93–1.76)
TSH SERPL DL<=0.05 MIU/L-ACNC: 1.19 UIU/ML (ref 0.4–4.2)
VALPROIC ACID LEVEL: < 2.8 UG/ML (ref 50–100)

## 2022-02-04 PROCEDURE — 80178 ASSAY OF LITHIUM: CPT

## 2022-02-04 PROCEDURE — 6370000000 HC RX 637 (ALT 250 FOR IP): Performed by: PSYCHIATRY & NEUROLOGY

## 2022-02-04 PROCEDURE — 1240000000 HC EMOTIONAL WELLNESS R&B

## 2022-02-04 PROCEDURE — 80164 ASSAY DIPROPYLACETIC ACD TOT: CPT

## 2022-02-04 PROCEDURE — 80156 ASSAY CARBAMAZEPINE TOTAL: CPT

## 2022-02-04 PROCEDURE — 84443 ASSAY THYROID STIM HORMONE: CPT

## 2022-02-04 PROCEDURE — 84439 ASSAY OF FREE THYROXINE: CPT

## 2022-02-04 PROCEDURE — 36415 COLL VENOUS BLD VENIPUNCTURE: CPT

## 2022-02-04 RX ORDER — TRAZODONE HYDROCHLORIDE 50 MG/1
50 TABLET ORAL NIGHTLY PRN
Status: DISCONTINUED | OUTPATIENT
Start: 2022-02-04 | End: 2022-02-10

## 2022-02-04 RX ORDER — IBUPROFEN 200 MG
400 TABLET ORAL EVERY 6 HOURS PRN
Status: DISCONTINUED | OUTPATIENT
Start: 2022-02-04 | End: 2022-02-11 | Stop reason: HOSPADM

## 2022-02-04 RX ORDER — ACETAMINOPHEN 325 MG/1
650 TABLET ORAL EVERY 4 HOURS PRN
Status: DISCONTINUED | OUTPATIENT
Start: 2022-02-04 | End: 2022-02-11 | Stop reason: HOSPADM

## 2022-02-04 RX ORDER — HYDROXYZINE HYDROCHLORIDE 25 MG/1
50 TABLET, FILM COATED ORAL 3 TIMES DAILY PRN
Status: DISCONTINUED | OUTPATIENT
Start: 2022-02-04 | End: 2022-02-11 | Stop reason: HOSPADM

## 2022-02-04 RX ORDER — MAGNESIUM HYDROXIDE/ALUMINUM HYDROXICE/SIMETHICONE 120; 1200; 1200 MG/30ML; MG/30ML; MG/30ML
30 SUSPENSION ORAL EVERY 6 HOURS PRN
Status: DISCONTINUED | OUTPATIENT
Start: 2022-02-04 | End: 2022-02-11 | Stop reason: HOSPADM

## 2022-02-04 RX ADMIN — CARIPRAZINE 3 MG: 3 CAPSULE, GELATIN COATED ORAL at 21:37

## 2022-02-04 ASSESSMENT — SLEEP AND FATIGUE QUESTIONNAIRES
DO YOU USE A SLEEP AID: YES
DIFFICULTY STAYING ASLEEP: YES
SLEEP PATTERN: DIFFICULTY FALLING ASLEEP;DISTURBED/INTERRUPTED SLEEP;INSOMNIA
DIFFICULTY ARISING: NO
DO YOU HAVE DIFFICULTY SLEEPING: YES
RESTFUL SLEEP: NO
DIFFICULTY FALLING ASLEEP: YES
AVERAGE NUMBER OF SLEEP HOURS: 3

## 2022-02-04 ASSESSMENT — PAIN SCALES - GENERAL
PAINLEVEL_OUTOF10: 4
PAINLEVEL_OUTOF10: 0

## 2022-02-04 ASSESSMENT — PAIN DESCRIPTION - DESCRIPTORS: DESCRIPTORS: ACHING

## 2022-02-04 ASSESSMENT — PAIN DESCRIPTION - PAIN TYPE: TYPE: CHRONIC PAIN

## 2022-02-04 ASSESSMENT — PAIN DESCRIPTION - ORIENTATION: ORIENTATION: RIGHT;LEFT

## 2022-02-04 ASSESSMENT — PAIN DESCRIPTION - LOCATION: LOCATION: ARM

## 2022-02-04 NOTE — PROGRESS NOTES
0700  Handover from [facility]. Awaiting transport of pt to [facility] by Denise.

## 2022-02-04 NOTE — ED NOTES
Pt eating a box lunch at this time.   dept called for transportation update and was informed it would be Monday before transport could be done     Suma Carrillo RN  02/04/22 1015

## 2022-02-04 NOTE — CONSULTS
[address] [place] [address]                                  CONSULTATION    PATIENT NAME: Joy Atkins                     :        1985  MED REC NO:   [id number]                           ROOM:       027  ACCOUNT NO:   [de-identified]                           ADMIT DATE: 2022  PROVIDER:     OMAR Garza DATE:  2022    CONSULT TO:  ED physician. REASON FOR CONSULT:  The patient needs assessment before transfer to  another psychiatric hospital.    SOURCES OF INFORMATION:  The patient and electronic medical record. IDENTIFYING INFORMATION:  The patient is a 26-year-old single   male. He has no children. He is a resident of [place] [address]. He  is disabled. HISTORY OF PRESENT ILLNESS:  The patient was brought to [facility] ED under a probate order from [facility]. The  patient was probated due to his behavior. He has a long history of  schizophrenia. He has been refusing his psychotropics. He is very  psychotic and he is religiously preoccupied. He talks about mental  communication. He feels like demons are entering his mind and he is  hearing spiritual voice. He talks about [place] salvation to  darkness. He has been at the [facility] for many years, but due to his  behavior, he was probated. He denies feeling depressed or anxious. According to his nurse at the [facility], he has been on multiple  psychotropics and he has been refusing them. PAST PSYCHIATRIC HISTORY:  He has had multiple psychiatric admissions  here at [facility]. Apparently, his last psychiatric  admission was in 2016. He follows up as outpatient at [facility]. He has been on multiple psychotropics and he has  A history of noncompliance on them. According to records from the  [facility], he has been through [facility] multiple times. According to  records, he has been on all atypical antipsychotics including Clozaril. FAMILY HISTORY:  His brother may have history of depression. No family  history of illicit drugs or alcohol. No family history of suicide  attempt. SOCIAL HISTORY:  The patient was born in [place], raised in  [place]. Parents were , they  when he was 11 years  old. He was raised mainly by his father. He says his father lives in  [place] and his mother in [place]. He has a full brother. He says he  keeps in touch with his parents, not with his brother. He has a local   as a guardian. He went to his senior year of high school, but  did not graduate. He went back and got his GED. He has never been  . He has been in different relationships over the years. He has  no meaningful job history. He is disabled. He has a history of heavy  cannabis use from 14 or 15 to about 18 or 19. Around that time, he  experienced cocaine and OxyContin. He says he has not done illicit  drugs since his early 24s. He has been at the [facility] for about  eight years, but the last six years at [facility]. He does  not smoke cigarettes. He has been charged in the past with under-age  consumption and possession of cannabis. He does believe in God. MEDICAL AND SURGICAL HISTORY:  Noncontributory other than nose  surgeries. MEDICATIONS:  None since he refused to take psychotropics. ALLERGIES:  OLANZAPINE. MENTAL STATUS EXAMINATION:  The patient appears stated age, dressed in a  hospital gown. He has good eye contact. Fair grooming and hygiene. He  is cooperative with the interview, but speech clear, spontaneous, and  coherent. Mood euthymic and affect blunted. He denies suicidal or  homicidal ideations. He is very psychotic. He reports hearing  spiritual voices. He is also religiously preoccupied. He has severe  anger outbursts due to his delusion.   He is alert and oriented x3. He  has fair attention and concentration. Memory appears to be intact as  tested within the context of the interview. Intelligence appears  average. Judgment and insight are poor. DIAGNOSES:  1.  Schizophrenia. 2.  Chronic mental illness, poor compliance with psychotropics. ASSESSMENT:  The patient is a 71-year-old single  male. He was  sent to [facility] ED on a probate order. He was supposed to go to the [facility], but since that was not  feasible, he is being admitted to [facility] for psychiatry. He was  not admitted to our unit due to possible violence. He has to be seen  within 24 hours. He is very psychotic. PLAN:  1. The patient to stay in the safe room pending transfer to [facility]. 2.  Support and reassurance given. 3.  If necessary, we will consider IM antipsychotic.         Liliana June M.D.    D: 02/03/2022 19:24:19       T: 02/03/2022 21:29:29     [email or web address]  Job#: [id number]     Doc#: [id number]    CC:

## 2022-02-04 NOTE — PROGRESS NOTES
1021  VM left with Wilson Roman 4E supervisor regarding probate status. [address] the current  at [facility] on case. 1035  Call from Wilson Roman who states to contact [address] [place] [address] on case. 1045  Call from [facility] who advises to check with Dr. Marvin Gordon on case. [address] to Dr. Marvin Gordon on case. States he will consider admitting him to 4E pending [facility] and patient agreement. [address] to [facility] Jorge at [facility]. She does believe that there would be any opposition however she will check with her director. 1130  Updated pt on POC. Pt voices some concerns about Dr. Marvin Gordon being his physician. Reports not wanting to be out on \"antipsychotics\". 200  Perfect served Dr. Marvin Gordon    Dr. Marvin Gordon states to admit to 4E. Updated [facility]. [address] at [facility] of admit to 4E.  1200  Call placed to [facility]. Spoke to HARSHAD Suarez. Updated that we no longer need the transport to [facility].

## 2022-02-04 NOTE — PROGRESS NOTES
Spoke to Ethan with [facility] for an update on transport. He states they will be unable to do it today. He states that the other counties around [place] are at a level 3. He states the earliest they can attempt is Monday because of staffing. They are at minimum staffing currently and most of the weekend. He advises to call on Monday around 8 am about transport. Transport officer is off for the weekend and will return on Monday.

## 2022-02-04 NOTE — PROGRESS NOTES
Behavioral Health   Admission Note     Admission Type:   Admission Type: Probate    Reason for admission:  Reason for Admission: Schizoaffective disorder    PATIENT STRENGTHS:  Strengths: No significant Physical Illness,Connection to output provider    Patient Strengths and Limitations:  Limitations: External locus of control,Difficult relationships / poor social skills    Addictive Behavior:        Medical Problems:   Past Medical History:   Diagnosis Date    Depression     Hallucination     Paranoid behavior ([facility] [address]     Schizoaffective disorder (HCC)        Status EXAM:  Status and Exam  Normal: No  Facial Expression: Elevated  Affect: Congruent  Level of Consciousness: Alert  Mood:Normal: No  Mood: Anxious  Motor Activity:Normal: No  Motor Activity: Increased  Interview Behavior: Cooperative  Preception: [place] to Person,[place] to Time,[place] to Place  Attention:Normal: No  Attention: Distractible  Thought Content:Normal: No  Thought Content: Delusions,Paranoia  Hallucinations: Auditory (Comment),Visual (Comment) (Patient states he \"hears and sees demons that abuse him\".)  Delusions: Yes  Delusions: Other(See Comment) (parinoia)  Memory:Normal: No  Memory: Poor Recent  Insight and Judgment: No  Insight and Judgment: Poor Judgment,Poor Insight  Present Suicidal Ideation: No  Present Homicidal Ideation: No    Pt admitted with followings belongings:  Dental Appliances: None  Vision - Corrective Lenses: None  Hearing Aid: None  Jewelry: None  Body Piercings Removed: N/A  Clothing: Jacket / Aaron [place] (Comment) (Belt)  Were All Patient Medications Collected?: Yes  Other Valuables: Cell phone,Wallet,Keys     Admission order obtained yes  Valuables sent home with N/A. Valuables placed in safe in security envelope, number:  [id number]. Patient's home medications were collected and locked in cabnit. Patient oriented to surroundings and program expectations and copy of patient rights given.  Received admission packet:  yes. Consents reviewed, signed yes. . Patient verbalize understanding:  yes. Patient education on precautions: yes           Patient screened positive for suicide risk on CSSR-S (\"yes\" to question #4, 5, OR 6)  no. Physician notified of risk score. Orders received yes . 2 person skin assessment completed upon admission refused. Explained patients right to have family, representative or physician notified of their admission. Patient has Declined for physician to be notified. Patient has Declined for family/representative to be notified. Provided pt with [employer] handout entitled \"Quitting Smoking. \"  Reviewed handout with pt addressing dangers of smoking, developing coping skills, and providing basic information about quitting. Pt response to counseling:  refused    Admission summary: Patient admitted to  from the ED, no report given from [facility]. Per Dr. Donita Garcia note on 2/3/22 Patient was placed under [facility] due to behavior. Patient has a long history of schizophrenia and is refusing psychotropic medications. Patient is religiously occupied and states \"he hears and sees demons that abuse him\". Patient is calm and cooperative during assessment.            Shyrl Sever, RN

## 2022-02-04 NOTE — ED NOTES
Pt given boxed lunch. Pt calm and cooperative. Voiced no concerns.       Bianca Lucas RN  02/03/22 6400

## 2022-02-04 NOTE — ED NOTES
[address] office was called regarding transport of patient to [facility] due to probate. 's office tell [address], charge RN, that no transports are ocurring at this time due to the level 3 road emergency. Told to call back later in day for possible transport. By 's department.      Abdirahman Ricci RN  02/04/22 8595

## 2022-02-04 NOTE — ED NOTES
Pt transported to [phone] accompanied by campus police.      Rudolm Saint, EMT-P  02/04/22 8396

## 2022-02-05 PROCEDURE — 6370000000 HC RX 637 (ALT 250 FOR IP): Performed by: PSYCHIATRY & NEUROLOGY

## 2022-02-05 PROCEDURE — 1240000000 HC EMOTIONAL WELLNESS R&B

## 2022-02-05 RX ADMIN — CARIPRAZINE 3 MG: 3 CAPSULE, GELATIN COATED ORAL at 08:17

## 2022-02-05 ASSESSMENT — PAIN SCALES - GENERAL
PAINLEVEL_OUTOF10: 0
PAINLEVEL_OUTOF10: 5

## 2022-02-05 ASSESSMENT — PAIN DESCRIPTION - PAIN TYPE: TYPE: ACUTE PAIN

## 2022-02-05 ASSESSMENT — PAIN DESCRIPTION - FREQUENCY: FREQUENCY: INTERMITTENT

## 2022-02-05 ASSESSMENT — PAIN DESCRIPTION - LOCATION: LOCATION: ARM

## 2022-02-05 ASSESSMENT — PAIN DESCRIPTION - PROGRESSION: CLINICAL_PROGRESSION: NOT CHANGED

## 2022-02-05 ASSESSMENT — PAIN - FUNCTIONAL ASSESSMENT: PAIN_FUNCTIONAL_ASSESSMENT: ACTIVITIES ARE NOT PREVENTED

## 2022-02-05 ASSESSMENT — PAIN DESCRIPTION - ORIENTATION: ORIENTATION: RIGHT;LEFT

## 2022-02-05 ASSESSMENT — PAIN DESCRIPTION - DESCRIPTORS: DESCRIPTORS: OTHER (COMMENT)

## 2022-02-05 NOTE — PROGRESS NOTES
Daily Progress Note  Brenna Ashford MD  2/5/2022    Reviewed patient's current plan of care and vital signs with nursing staff. Sleep:  8 hours last night  Attending groups: No  No reported Suicidal or homicidal thought; he told staff he has visual communication and that his mouth speaks involuntary but he will not elaborate; last night he heard voices of an usual and demons; he is religiously preoccupied. He took Vraylar last night and this morning. Feels voices are getting less. SUBJECTIVE:    Patient is feeling somewhat better. SUICIDAL IDEATION denies suicidal ideation. Patient does not have medication side effects. ROS: Patient has new complaints:  No  Sleeping adequately:  Yes  Visitors: No    Mental Status Examination:  Patient is cooperative. Speech: Normal rate and tone. No abnormal movements, tics or mannerisms. Mood euthymic; affect flat affect. Suicidal ideation Absent. Homicidal ideations Absent. Hallucinations Present. Delusions Present. Thought Content: delusional. Thought Processes: Loose Associations. Alert and oriented X 3. Attention and concentration fair. MEMORY intact. Insight and Judgement impaired insight. Data   height is 5' 9\" (1.753 m) and weight is 150 lb (68 kg). His tympanic temperature is 97.1 °F (36.2 °C). His blood pressure is 115/77 and his pulse is 88. His respiration is 16 and oxygen saturation is 96%.    Labs:            Medications  Current Facility-Administered Medications: acetaminophen (TYLENOL) tablet 650 mg, 650 mg, Oral, Q4H PRN  ibuprofen (ADVIL;MOTRIN) tablet 400 mg, 400 mg, Oral, Q6H PRN  hydrOXYzine (ATARAX) tablet 50 mg, 50 mg, Oral, TID PRN  magnesium hydroxide (MILK OF MAGNESIA) 400 MG/5ML suspension 30 mL, 30 mL, Oral, Daily PRN  aluminum & magnesium hydroxide-simethicone (MAALOX) 200-200-20 MG/5ML suspension 30 mL, 30 mL, Oral, Q6H PRN  cariprazine hcl (VRAYLAR) capsule 3 mg, 3 mg, Oral, Daily  traZODone (DESYREL) tablet 50 mg, 50 mg, Oral,

## 2022-02-05 NOTE — DISCHARGE INSTR - DIET

## 2022-02-05 NOTE — PROGRESS NOTES
Daily Progress Note  Susana Cruz MD  2/4/2022    Reviewed patient's current plan of care and vital signs with nursing staff. Sleep:  ? hours last night  Attending groups: No  Patient was seen yesterday on consult in the emergency room. He was supposed to be transferred to [facility] today unfortunately there was no transportation available through the  department until February 7. Patient is probated but due to agitation at the group home, he was supposed to go initially to [facility]. He was accepted to [facility]. But he has not been agitated during his entire stay in the emergency room. I was called multiple times earlier and I decided to admit him on our unit. He is religiously preoccupied. He cannot stop talking about demons. He was given the option to be on different psychotropics. He admits he has been on most or all atypical antipsychotics including Caplyta. He does not want CROSS. After a long discussion, he is agreeable upon a trial of Llana Slava since apparently he has never been on it. SUBJECTIVE:    Patient is feeling unchanged. SUICIDAL IDEATION denies suicidal ideation. Patient does not have medication side effects. ROS: Patient has new complaints:  No  Sleeping adequately:  Yes  Visitors: No    Mental Status Examination:  Patient is cooperative. Speech: Normal rate and tone. No abnormal movements, tics or mannerisms. Mood euthymic; affect flat affect. Suicidal ideation Absent. Homicidal ideations Absent. Hallucinations Present. Delusions Present. Thought Content: delusional. Thought Processes: Loose Associations. Alert and oriented X 3. Attention and concentration fair. MEMORY intact. Insight and Judgement impaired insight. Data   height is 5' 9\" (1.753 m) and weight is 150 lb (68 kg). His tympanic temperature is 96.9 °F (36.1 °C). His blood pressure is 114/76 and his pulse is 96. His respiration is 18 and oxygen saturation is 100%.    Labs:   Admission on 02/02/2022   Component Date Value Ref Range Status    WBC 02/02/2022 6.4  4.8 - 10.8 thou/mm3 Final    RBC 02/02/2022 4.79  4.70 - 6.10 mill/mm3 Final    Hemoglobin 02/02/2022 15.0  14.0 - 18.0 gm/dl Final    Hematocrit 02/02/2022 43.2  42.0 - 52.0 % Final    MCV 02/02/2022 90.2  80.0 - 94.0 fL Final    MCH 02/02/2022 31.3  26.0 - 33.0 pg Final    MCHC 02/02/2022 34.7  32.2 - 35.5 gm/dl Final    RDW-CV 02/02/2022 12.3  11.5 - 14.5 % Final    RDW-SD 02/02/2022 40.1  35.0 - 45.0 fL Final    Platelets 12/22/5166 210  130 - 400 thou/mm3 Final    MPV 02/02/2022 9.9  9.4 - 12.4 fL Final    Seg Neutrophils 02/02/2022 71.3  % Final    Lymphocytes 02/02/2022 16.1  % Final    Monocytes 02/02/2022 7.7  % Final    Eosinophils 02/02/2022 3.3  % Final    Basophils 02/02/2022 1.3  % Final    Immature Granulocytes 02/02/2022 0.3  % Final    Segs Absolute 02/02/2022 4.6  1.8 - 7.7 thou/mm3 Final    Lymphocytes Absolute 02/02/2022 1.0  1.0 - 4.8 thou/mm3 Final    Monocytes Absolute 02/02/2022 0.5  0.4 - 1.3 thou/mm3 Final    Eosinophils Absolute 02/02/2022 0.2  0.0 - 0.4 thou/mm3 Final    Basophils Absolute 02/02/2022 0.1  0.0 - 0.1 thou/mm3 Final    Immature Grans (Abs) 02/02/2022 0.02  0.00 - 0.07 thou/mm3 Final    nRBC 02/02/2022 0  /100 wbc Final    Performed at [address] [place] [address], [address]    Sodium 02/02/2022 138  135 - 145 meq/L Final    Potassium reflex Magnesium 02/02/2022 4.1  3.5 - 5.2 meq/L Final    Chloride 02/02/2022 100  98 - 111 meq/L Final    CO2 02/02/2022 26  23 - 33 meq/L Final    Glucose 02/02/2022 114* 70 - 108 mg/dL Final    BUN 02/02/2022 21  7 - 22 mg/dL Final    CREATININE 02/02/2022 0.9  0.4 - 1.2 mg/dL Final    Calcium 02/02/2022 9.3  8.5 - 10.5 mg/dL Final    Performed at [address] [place] [address], [facility] 02/02/2022 < 0.01  0.00 % Final    Performed at [address] [place] [address], [address]    Acetaminophen Level 02/02/2022 < 5.0  0.0 - 20.0 ug/mL Final    Performed at [address] [place] [address], [address]    Salicylate, Serum 90/09/2333 < 0.3* 2.0 - 10.0 mg/dL Final    Performed at [address] [place] [address], [address]    AMPHETAMINE+METHAMPHETAMINE URINE * 02/02/2022 Negative  NEGATIVE Final    Barbiturate Quant, Ur 02/02/2022 Negative  NEGATIVE Final    Benzodiazepine Quant, Ur 02/02/2022 Negative  NEGATIVE Final    Cannabinoid Quant, Ur 02/02/2022 Negative  NEGATIVE Final    Cocaine Metab Quant, Ur 02/02/2022 Negative  NEGATIVE Final    Opiates, Urine 02/02/2022 Negative  NEGATIVE Final    Oxycodone 02/02/2022 Negative  NEGATIVE Final    PCP Quant, Ur 02/02/2022 Negative  NEGATIVE Final    Comment: A \"Negative\" result for a drug abuse screen test indicates     that the drug concentration is below the following cutoffs:            Amphetamine/Methamphetamine     1000 ng/ml            Barbiturate                      200 ng/ml            Benzodiazapine                   200 ng/ml            Cannabinoids                      50 ng/ml            Cocaine Metabolite               300 ng/ml            Opiates                          300 ng/ml            Oxycodone                        100 ng/ml            Phencyclidine                     25 ng/ml  A \"Positive\" result for a drug abuse screen test should be     considered presumptive positive until/unless confirmed     by another method. (Additional request)  Quantitative values from a reference laboratory are     available upon additional request.  These results are for medical use only.   Performed at [address] [place] [address], [address]      Anion Gap 02/02/2022 12.0  8.0 - 16.0 meq/L Final    Comment: ANION GAP = Sodium -(Chloride + CO2)  Performed at [address] [place] [address], [address]     Rocío Marva Filt Rate 02/02/2022 >90  ml/min/1.73m2 Final    Comment: Stage Description GFR, ml/min/1.73 m2   -   At increased risk               > or = 60 (with chronic                                       kidney disease risk factors)   1   Normal or increased GFR         > or = 90   2   Mildly or decreased GFR         60 - 89   3   Moderately decreased GFR        30 - 59   4   Severely decreased GFR          15 - 29   5   Kidney failure                  <15 (or dialysis)  Estimated GFR calculated using abbreviated MDRD formula as  recommended by [employer]. Calculation based  upon serum creatinine and adjusted for age, gender & race. Esther. Internal Med., Vol. 139 (2) pg 137-147. Performed at [address] [place] [address], [address]      Osmolality Calc 02/02/2022 279.5  275.0 - 300.0 mOsmol/kg Final    Performed at [address] [place] [address], [address]    SARS-CoV-2, NAAT 02/02/2022 NOT  DETECTED  NOT DETECTED Final    Comment: Rapid NAAT:   Negative results should be treated as presumptive and,  if inconsistent with clinical signs and symptoms or necessary for  patient management, should be tested with an alternative molecular  assay. Negative results do not preclude SARS-CoV-2 infection and  should not be used as the sole basis for patient management decisions. This test has been authorized by the FDA under an Emergency Use  Authorization (EUA) for use by authorized laboratories. Fact sheet for Healthcare Providers:  BuildHer.es  Fact sheet for Patients: BuildHer.es    METHODOLOGY: Isothermal Nucleic Acid Amplification  Performed at [address] [place] [address], [address]      Carbamazepine, Total 02/04/2022 < 2.0  2.0 - 10.0 mcg/ml Final    Comment: Many variables influence therapeutic and toxic ranges;  results should be  interpreted in conjunction with clinical status of patient.   Performed at [address] [place] [address], [address]      [address] Lvl 02/04/2022 0.10* 0.60 - 1.20 mmol/L Final    Comment: Many variables influence therapeutic and toxic ranges;  results should be  interpreted in conjunction with clinical status of patient. Performed at [address] [place] [address], [address]      Valproic Acid Lvl 02/04/2022 < 2.8* 50.0 - 100.0 ug/mL Final    Comment: Many variables influence therapeutic and toxic ranges;  results should be  interpreted in conjunction with clinical status of patient. Performed at [facility], [address]     Geneginger Wues TSH 02/04/2022 1.190  0.400 - 4.200 uIU/mL Final    Performed at [address] [place] [address], [address]    T4 Free 02/04/2022 1.19  0.93 - 1.76 ng/dL Final    Performed at [address] [place] [address], [address]            Medications  Current Facility-Administered Medications: acetaminophen (TYLENOL) tablet 650 mg, 650 mg, Oral, Q4H PRN  ibuprofen (ADVIL;MOTRIN) tablet 400 mg, 400 mg, Oral, Q6H PRN  hydrOXYzine (ATARAX) tablet 50 mg, 50 mg, Oral, TID PRN  magnesium hydroxide (MILK OF MAGNESIA) 400 MG/5ML suspension 30 mL, 30 mL, Oral, Daily PRN  aluminum & magnesium hydroxide-simethicone (MAALOX) 200-200-20 MG/5ML suspension 30 mL, 30 mL, Oral, Q6H PRN    ASSESSMENT  Schizophrenia (HCC)     PLAN  Patient's symptoms show no change  Continue with current medications. Start Vraylar  Attempt to develop insight  Psycho-education conducted. Supportive Therapy conducted. Behavioral Services  Medicare Certification     Admission Day 1  I certify that this patient's inpatient psychiatric hospital admission is medically necessary for:      X (1) treatment which could reasonably be expected to improve this patient's condition, or    _ (2) diagnostic study or its equivalent.        Physicians Signature: Electronically signed by Ana Avila MD on 2/4/2022 at 7:18 PM

## 2022-02-05 NOTE — PROGRESS NOTES
Group Therapy Note    Date: 2/4/2022  Start Time: 2000  End Time:  2020    Status After Intervention:  Improved    Participation Level:  Active Listener and Interactive    Participation Quality: Appropriate and Attentive      Speech:  normal      Thought Process/Content: Logical      Affective Functioning: Congruent      Mood: anxious      Level of consciousness:  Alert and Oriented x4      Response to Learning: Able to verbalize current knowledge/experience      Endings: None Reported    Modes of Intervention: Support and Socialization      Discipline Responsible: Registered Nurse      Signature:  Leo Bledsoe RN

## 2022-02-05 NOTE — PROGRESS NOTES
Psychosocial Assessment    Current Level of Psychosocial Functioning     Independent   Dependent       XXX  Minimal Assist     Comments:      Psychosocial High Risk Factors (check all that apply)    Unable to obtain meds   Chronic illness/pain    Substance abuse   Lack of Family Support   Financial stress   Isolation   Inadequate Community Resources  Suicide attempt(s)  Not taking medications     XXX  Victim of crime   Developmental Delay  Unable to manage personal needs    Age 72 or older   Homeless  No transportation   Readmission within 30 days  Unemployment  Traumatic Event    Family/Supports identified:    [facility] staff, Guardian    Sexual Orientation:      Heterosexual    Patient Strengths:     Supportive living, well spoken      Patient Barriers:      Poor insight, Not compliant with his medication    Safety plan:      Contracts for safety    CMHC/ history:     XXX    Plan of Care:  medication management, group/individual therapies, family meetings, psycho -education, treatment team meetings to assist with stabilization. Initial Discharge Plan:  Pt resides at [address] [place] [address]. He follows with Psychiatric care through [facility]. He has a Guardian and a Payee arranged through [facility]. Clinical Summary:  Rogerio Samuel is a 44year old male who is admitted to the unit under a probate order. Pt has a very long and significant history of mental illness. He has had many inpatient psychiatric admissions. He was brought to the ED under a Probate order. After being medically cleared, he was admitted to this unit. Pt is unable to tell me why he was brought to the ED. He seems opposed to taking his medication however. He is known to have a history of schizophrenia. Pt is religiously preoccupied and tells me that he is experiencing daemons who are causing his mouth to move involuntarily.  Patient tells me that he felt the light of

## 2022-02-05 NOTE — PROGRESS NOTES
[facility]  Initial Interdisciplinary Treatment Plan NOTE    Review Date & Time: 02/05/22  1156    Patient was in treatment team.  See Multidisciplinary Treatment Team sheet for participants. Admission Type:   Admission Type: Probate    Reason for admission:  Reason for Admission: Schizoaffective disorder      Estimated Length of Stay Update:  02/06/22  Estimated Discharge Date Update: 3-5 days    PATIENT STRENGTHS:  Patient Strengths Strengths: No significant Physical Illness,Connection to output provider  Patient Strengths and Limitations:Limitations: External locus of control,Difficult relationships / poor social skills  Addictive Behavior:   Medical Problems:  Past Medical History:   Diagnosis Date    Depression     Hallucination     Paranoid behavior ([facility]     Schizoaffective disorder ([facility]        EDUCATION:   Learner Progress Toward Treatment Goals: Reviewed results and recommendations of this team, Reviewed group plan and strategies, Reviewed signs, symptoms and risk of self harm and violent behavior and Reviewed goals and plan of care    Method: Individual    Outcome: Needs reinforcement and No evidence of Learning, Pt is religiously preoccupied and reports experiencing daemons who are causing his mouth to move involuntarily. PATIENT GOALS: Stabilize py psychiatric symptoms. PLAN/TREATMENT RECOMMENDATIONS UPDATE:   1. What is the most important thing we can help you with while you are here? To switch providers to [facility]  2. Who is your support system? Legal Guardian, [facility] staff  3. Do you have follow-up providers? [facility]  4. Do you have the ability to pay for your medications? Medicare, Medicaid secondary  5. Where will you be residing when you leave the hospital? [address] [facility]  6. Will need a return to work slip or FMLA paper completion?  None      GOALS UPDATE:   Time frame for Short-Term Goals: Daily    HANSEL Murcia

## 2022-02-05 NOTE — PROGRESS NOTES
Patient alert and oriented x 3, not to situation. Patient rates mood \"7\" out of 10. Reports \"little\" anxiety, \"I have it all the time\", denies depression, denies suicidal and  Homicidal, reports visual and auditory hallucinations, sees and hears demons, \"I have visual communications, my mouth speaks involuntary. Its hard to speak about it, I don't want to raise red flags\". Reports feeling hopeful and parents are supportive, plans on returning to [facility] after discharge. Patient on the fringe with peers, attended morning group.

## 2022-02-05 NOTE — GROUP NOTE
Group Therapy Note    Date: 2/5/2022    Group Start Time: 8891  Group End Time: 1100  Group Topic: Healthy Living/Wellness    [facility]    Swati Lackey        Group Therapy Note    Attendees: Patients participated in a meditation and mindfulness session and listened to a talk about finding alida in their journey to mental wellness. Patient's Goal:  To communicate more. Notes:  Patient fully participated in the activity and was eager to share his feelings and what he learned in group. Status After Intervention:  Improved    Participation Level:  Active Listener and Interactive    Participation Quality: Appropriate, Attentive and Sharing      Speech:  normal      Thought Process/Content: Linear      Affective Functioning: Congruent      Mood: euthymic      Level of consciousness:  Alert and Attentive      Response to Learning: Able to verbalize/acknowledge new learning, Able to change behavior and Progressing to goal      Endings: None Reported    Modes of Intervention: Exploration, Activity, Movement and Media      Discipline Responsible: [employer]      Signature:  Fernando Flowers

## 2022-02-05 NOTE — PROGRESS NOTES
Patient was cooperative with shift assessment. Patient talks about auditory and visual hallucinations. Patient talks about hearing angels and demons talking. When asked if he was hopeful for his future he stated \"that's debatable\". Patient did take his bedtime pills and eat a snack.

## 2022-02-06 PROCEDURE — 6370000000 HC RX 637 (ALT 250 FOR IP): Performed by: PSYCHIATRY & NEUROLOGY

## 2022-02-06 PROCEDURE — 1240000000 HC EMOTIONAL WELLNESS R&B

## 2022-02-06 RX ADMIN — CARIPRAZINE 3 MG: 3 CAPSULE, GELATIN COATED ORAL at 09:07

## 2022-02-06 ASSESSMENT — PAIN SCALES - GENERAL
PAINLEVEL_OUTOF10: 0
PAINLEVEL_OUTOF10: 0

## 2022-02-06 NOTE — GROUP NOTE
Group Therapy Note    Date: 2/6/2022    Group Start Time: 2410  Group End Time: 1130  Group Topic: Healthy Living/Wellness    [facility]    Swati Lackey        Group Therapy Note    Attendees: This writer held a relaxation session followed by the card game, Damian Delatorre. \"         Patient's Goal:  Take a shower. Notes:  Patient fully participated in the activity and was eager to share his thoughts and feelings in the card game. Status After Intervention:  Improved    Participation Level:  Active Listener and Interactive    Participation Quality: Appropriate, Attentive and Sharing      Speech:  normal      Thought Process/Content: Linear      Affective Functioning: Congruent      Mood: anxious      Level of consciousness:  Alert and Attentive      Response to Learning: Able to verbalize/acknowledge new learning, Able to retain information and Progressing to goal      Endings: None Reported    Modes of Intervention: Support, Exploration, Activity, Movement and Media      Discipline Responsible: [address] [facility] [employer]      Signature:  Bebeto Villa

## 2022-02-06 NOTE — PROGRESS NOTES
Daily Progress Note  Yue Ulloa MD  2/6/2022    Reviewed patient's current plan of care and vital signs with nursing staff. Sleep: 6.5 hours last night broken  Attending groups: Yes  No reported Suicidal or homicidal thought; he told staff he has delusional speaking and it is hard for him to express himself. Last night he was hearing voices of children of Nish. Mood 7 on a scale of 1 to 10 with 10 is feeling normal.    SUBJECTIVE:    Patient is feeling somewhat better. SUICIDAL IDEATION denies suicidal ideation. Patient does not have medication side effects. ROS: Patient has new complaints:  No  Sleeping adequately:  Yes  Visitors: No    Mental Status Examination:  Patient is cooperative. Speech: Normal rate and tone. No abnormal movements, tics or mannerisms. Mood euthymic; affect flat affect. Suicidal ideation Absent. Homicidal ideations Absent. Hallucinations Present. Delusions Present. Thought Content: delusional. Thought Processes: Loose Associations. Alert and oriented X 3. Attention and concentration fair. MEMORY intact. Insight and Judgement impaired insight. Data   height is 5' 9\" (1.753 m) and weight is 150 lb (68 kg). His temperature is 97.2 °F (36.2 °C). His blood pressure is 119/71 and his pulse is 70. His respiration is 16 and oxygen saturation is 98%.    Labs:            Medications  Current Facility-Administered Medications: acetaminophen (TYLENOL) tablet 650 mg, 650 mg, Oral, Q4H PRN  ibuprofen (ADVIL;MOTRIN) tablet 400 mg, 400 mg, Oral, Q6H PRN  hydrOXYzine (ATARAX) tablet 50 mg, 50 mg, Oral, TID PRN  magnesium hydroxide (MILK OF MAGNESIA) 400 MG/5ML suspension 30 mL, 30 mL, Oral, Daily PRN  aluminum & magnesium hydroxide-simethicone (MAALOX) 200-200-20 MG/5ML suspension 30 mL, 30 mL, Oral, Q6H PRN  cariprazine hcl (VRAYLAR) capsule 3 mg, 3 mg, Oral, Daily  traZODone (DESYREL) tablet 50 mg, 50 mg, Oral, Nightly PRN    ASSESSMENT  Schizophrenia (HCC)     PLAN  Patient's symptoms are improving slightly  Continue with current medications. Attempt to develop insight  Psycho-education conducted. Supportive Therapy conducted.

## 2022-02-06 NOTE — PROGRESS NOTES
Group Therapy Note    Date: 2/5/2022  Start Time: 2000  End Time:  2022      Status After Intervention:  Unchanged    Participation Level: Interactive    Participation Quality: Attentive and Sharing      Speech:  normal      Thought Process/Content: Logical      Affective Functioning: Blunted      Mood: depressed      Level of consciousness:  Alert and Oriented x4      Response to Learning: Able to verbalize current knowledge/experience      Endings: None Reported    Modes of Intervention: Support      Discipline Responsible: Registered Nurse      Signature:  Fozia Redmond RN

## 2022-02-06 NOTE — PROGRESS NOTES
5 [facility]  Day 3 Interdisciplinary Treatment Plan NOTE    Review Date & Time: 02/06/22 1525    Patient was in treatment team    Admission Type:   Admission Type: Probate    Reason for admission:  Reason for Admission: Schizoaffective disorder  Estimated Length of Stay Update:  02/10/22  Estimated Discharge Date Update: 3-5 days    PATIENT STRENGTHS:  Patient Strengths Strengths: No significant Physical Illness,Connection to output provider  Patient Strengths and Limitations:Limitations: External locus of control,Difficult relationships / poor social skills  Addictive Behavior:   Medical Problems:  Past Medical History:   Diagnosis Date    Depression     Hallucination     Paranoid behavior ([facility] [address]     Schizoaffective disorder ([facility] [address]        Risk:  Fall RiskTotal: 59  Aravind Scale Aravind Scale Score: 22  BVC        Status EXAM:   Status and Exam  Normal: No  Facial Expression: Flat  Affect: Blunt  Level of Consciousness: Alert  Mood:Normal: No  Mood: Anxious,Suspicious  Motor Activity:Normal: Yes  Motor Activity: Increased  Interview Behavior: Cooperative  Preception: Perrin to Person,Perrin to Time,Perrin to Place  Attention:Normal: No  Attention: Distractible,Unable to Concentrate  Thought Processes: Circumstantial  Thought Content:Normal: No  Thought Content: Paranoia,Preoccupations  Hallucinations: Auditory (Comment)  Delusions: Yes  Delusions: Other(See Comment) (paranoid)  Memory:Normal: No  Memory: Poor Recent  Insight and Judgment: No  Insight and Judgment: Poor Judgment,Poor Insight  Present Suicidal Ideation: No  Present Homicidal Ideation: No    Daily Assessment Last Entry:   Daily Sleep (WDL): Within Defined Limits         Patient Currently in Pain: Denies  Daily Nutrition (WDL): Within Defined Limits    Patient Monitoring:  Frequency of Checks: 4 times per hour, close    Psychiatric Symptoms:   Depression Symptoms  Depression Symptoms: No problems reported or observed.   Anxiety Symptoms  Anxiety Symptoms: Generalized  Skylar Symptoms  Skylar Symptoms: No problems reported or observed. Psychosis Symptoms  Delusion Type: Paranoid,Faith    Suicide Risk CSSR-S:  1) Within the past month, have you wished you were dead or wished you could go to sleep and not wake up? : No  2) Have you actually had any thoughts of killing yourself? : No  3) Have you been thinking about how you might kill yourself? : No  5) Have you started to work out or worked out the details of how to kill yourself? Do you intend to carry out this plan? : No  6) Have you ever done anything, started to do anything, or prepared to do anything to end your life?: Yes        EDUCATION:   Learner Progress Toward Treatment Goals: Reviewed results and recommendations of this team, Reviewed group plan and strategies, Reviewed signs, symptoms and risk of self harm and violent behavior and Reviewed goals and plan of care    Method: Individual    Outcome: Verbalized understanding and Needs reinforcement    PATIENT GOALS: [facility] providers to [facility]    PLAN/TREATMENT RECOMMENDATIONS UPDATE:  1. How are you progressing toward meeting your main treatment goal? Pt remains very religiously preoccupied but feels good about the medication that Dr. Ariana Green is providing. 2.  Are there discharge barriers/lingering problems that need to be addressed? None      3. Do you have the ability to pay for your medications? Medicare and medicaid      4. How is your group participation?   Not attending    GOALS UPDATE:   Time frame for Short-Term Goals: Daily      HANSEL Chen

## 2022-02-07 PROCEDURE — 6370000000 HC RX 637 (ALT 250 FOR IP): Performed by: PSYCHIATRY & NEUROLOGY

## 2022-02-07 PROCEDURE — 1240000000 HC EMOTIONAL WELLNESS R&B

## 2022-02-07 RX ADMIN — CARIPRAZINE 3 MG: 3 CAPSULE, GELATIN COATED ORAL at 11:13

## 2022-02-07 RX ADMIN — CARIPRAZINE 3 MG: 3 CAPSULE, GELATIN COATED ORAL at 08:12

## 2022-02-07 ASSESSMENT — PAIN - FUNCTIONAL ASSESSMENT: PAIN_FUNCTIONAL_ASSESSMENT: ACTIVITIES ARE NOT PREVENTED

## 2022-02-07 ASSESSMENT — PAIN DESCRIPTION - PROGRESSION: CLINICAL_PROGRESSION: NOT CHANGED

## 2022-02-07 ASSESSMENT — PAIN DESCRIPTION - ONSET: ONSET: GRADUAL

## 2022-02-07 ASSESSMENT — PAIN DESCRIPTION - PAIN TYPE: TYPE: ACUTE PAIN

## 2022-02-07 ASSESSMENT — PAIN DESCRIPTION - DESCRIPTORS: DESCRIPTORS: TIGHTNESS

## 2022-02-07 ASSESSMENT — PAIN DESCRIPTION - ORIENTATION: ORIENTATION: RIGHT;LEFT

## 2022-02-07 ASSESSMENT — PAIN DESCRIPTION - LOCATION: LOCATION: ARM

## 2022-02-07 ASSESSMENT — PAIN SCALES - GENERAL
PAINLEVEL_OUTOF10: 0
PAINLEVEL_OUTOF10: 0
PAINLEVEL_OUTOF10: 8

## 2022-02-07 ASSESSMENT — PAIN DESCRIPTION - FREQUENCY: FREQUENCY: INTERMITTENT

## 2022-02-07 NOTE — PROGRESS NOTES
Case Management [id number] telephoned pt legal guardian, Vicky Sevilla [phone]. I faxed the Voluntary application for admission to be signed and returned. Fax number [phone].

## 2022-02-07 NOTE — GROUP NOTE
Group Therapy Note    Date: 2/7/2022    Group Start Time: 1100  Group End Time: Boeing  Group Topic: Healthy Living/Wellness    [facility] Adult [facility] 4E    Kiran Levine LPN        Group Therapy Note    Attendees: 4             Notes:  attended    Status After Intervention:  Improved    Participation Level:  Active Listener    Participation Quality: Appropriate and Attentive      Speech:  normal      Thought Process/Content: Logical      Affective Functioning: Flat          Level of consciousness:  Alert, Oriented x4 and Attentive      Response to Learning: Able to verbalize/acknowledge new learning, Able to retain information and Capable of insight      Endings: None Reported    Modes of Intervention: Education and Socialization      Discipline Responsible: Licensed Practical Nurse      Signature:  Kiran Levine LPN

## 2022-02-07 NOTE — BH NOTE
Group Therapy Note    Date: 2/6/2022  Start Time: 2000  End Time:  2020  Number of Participants: 1    Type of Group: Wrap-Up    Wellness Binder Information  Module Name:    Session Number:      Patient's Goal:  To shower    Notes:  met    Status After Intervention:  Unchanged    Participation Level: Minimal    Participation Quality: Attentive      Speech:  hesitant      Thought Process/Content: Delusional      Affective Functioning: Flat      Mood: anxious      Level of consciousness:  Alert      Response to Learning: Able to verbalize current knowledge/experience      Endings: None Reported    Modes of Intervention: Education      Discipline Responsible: Registered Nurse      Signature:   Ike Angulo, RN

## 2022-02-07 NOTE — BH NOTE
INPATIENT RECREATIONAL THERAPY  ADULT BEHAVIORAL SERVICES  EVALUATION    REFERRING PHYSICIAN:  Dr. Nia Trujillo  DIAGNOSIS:   Schizoaffective Disorder  PRECAUTIONS:  Standard Precautions    HISTORY OF PRESENT ILLNESS/INJURY: Pt presents to ED under a probate from [facility] due to his behaviors. Pt has been refusing his psychotropic medications, is psychotic and is religiously preoccupied. Pt reports he feels like demons are entering his mind and he is hearing spiritual voices. Pt talks about [place] salvation to darkness. PMH:  Please see medical chart for prior medical history, allergies, and medications. HISTORY OF PSYCHIATRIC TREATMENT: Pt has had numerous psychiatric admissions to the unit, with the last being 7/2016. Pt reports following up outpatient at [facility]. YOB: 1985  GENDER:  Male    MARITAL STATUS:  Single  EMPLOYMENT STATUS:  Pt is on disability. LIVING SITUATION/SUPPORT:  Pt lives at the [address] [place], [place] [address]vard: Pt reports going to [employer] his Senior year, but did not graduate. Pt went back for his GED. MEDICATION/DRUG USE: Pt has been noncompliant with his medications, and has a hx of noncompliance. Pt has a hx of marijuana use. Pt also has a hx of cocaine and OxyContin use, reports he has not done illicit drugs since his early 25s. LEISURE INTERESTS:  spiritual activities, arts and crafts activities, drawing  ACTIVITY PREFERENCE: individual  ACTIVITY TYPES:  passive, active, indoor, outdoor  COGNITION: A&Ox4    COPING: poor  ATTENTION: fair  RELAXATION: pt appears anxious.   Pt has a hx of poor sleep  SELF-ESTEEM: fair  MOTIVATION:  poor    SOCIAL SKILLS:  Poor- pt is guarded and appears paranoid  FRUSTRATION TOLERANCE:  No hx of violence documented in pt chart  ATTENTION SEEKING: No attention seeking behaviors noted at this time  COOPERATION: Pt is cooperative  AFFECT: Pt displays a congruent affect  APPEARANCE: Pt displays fair grooming and hygiene and is dressed in hospital attire. HEARING:  No impairments noted at this time  VISION:  No impairments noted at this time  VERBAL COMMUNICATION:  No impairments noted at this time  WRITTEN COMMUNICATION:  No impairments noted at this time    COORDINATION: No impairments noted at this time  MOBILITY: Pt ambulates independently  GOALS: Pt will improve socialization and knowledge of coping skills through participation of at least two group therapy sessions, daily, following encouragement from staff.

## 2022-02-07 NOTE — BH NOTE
Patient reports he had experienced a miracle today. He has had balls of light around him that stopped him from feeling better and God had taken them away to where he feels better and is able to feel alida. Patient also states that he is learning to make peace with the demons and to speak to them with the word of God and he feels the attacks have lessened and been less intense and that this will be over soon. He states he feels his skin has been defiled and he might have cancer from it.

## 2022-02-07 NOTE — PROGRESS NOTES
Daily Progress Note  Soco Fernandez MD  2/7/2022    Reviewed patient's current plan of care and vital signs with nursing staff. Sleep: 6 hours last night broken  Attending groups: Yes  No reported Suicidal or homicidal thought; he remains delusional especially Confucianist delusions. He told staff that voices are getting sparse. Mood 5 on a scale of 1 to 10 with 10 is feeling normal.    SUBJECTIVE:    Patient is feeling somewhat better. SUICIDAL IDEATION denies suicidal ideation. Patient does not have medication side effects. ROS: Patient has new complaints:  No  Sleeping adequately:  Yes  Visitors: No  \"I feel happier with the medication\"    Mental Status Examination:  Patient is cooperative. Speech: Normal rate and tone. No abnormal movements, tics or mannerisms. Mood euthymic; affect flat affect. Suicidal ideation Absent. Homicidal ideations Absent. Hallucinations Absent. Delusions Present. Thought Content: delusional. Thought Processes: Loose Associations. Alert and oriented X 3. Attention and concentration fair. MEMORY intact. Insight and Judgement impaired insight. Data   height is 5' 9\" (1.753 m) and weight is 150 lb (68 kg). His oral temperature is 97.4 °F (36.3 °C). His blood pressure is 115/72 and his pulse is 77. His respiration is 16 and oxygen saturation is 97%.    Labs:            Medications  Current Facility-Administered Medications: acetaminophen (TYLENOL) tablet 650 mg, 650 mg, Oral, Q4H PRN  ibuprofen (ADVIL;MOTRIN) tablet 400 mg, 400 mg, Oral, Q6H PRN  hydrOXYzine (ATARAX) tablet 50 mg, 50 mg, Oral, TID PRN  magnesium hydroxide (MILK OF MAGNESIA) 400 MG/5ML suspension 30 mL, 30 mL, Oral, Daily PRN  aluminum & magnesium hydroxide-simethicone (MAALOX) 200-200-20 MG/5ML suspension 30 mL, 30 mL, Oral, Q6H PRN  cariprazine hcl (VRAYLAR) capsule 3 mg, 3 mg, Oral, Daily  traZODone (DESYREL) tablet 50 mg, 50 mg, Oral, Nightly PRN    ASSESSMENT  Schizophrenia (HCC)     PLAN  Patient's symptoms are improving some  Continue with current medications. Increase Vraylar. Attempt to develop insight  Psycho-education conducted. Supportive Therapy conducted.

## 2022-02-07 NOTE — PLAN OF CARE
Problem: Altered Mood, Manic Behavior:  Goal: Able to sleep  Description: Able to sleep  Outcome: Ongoing  Note: Patient resting quietly with no distress noted  Goal: Ability to disclose and discuss suicidal ideas will improve  Description: Ability to disclose and discuss suicidal ideas will improve  Outcome: Ongoing  Note: Patient denies self harm or suicidal thoughts  Goal: Absence of self-harm  Description: Absence of self-harm  Outcome: Ongoing  Note: Patient denies self harm or suicidal thoughts  Goal: Ability to achieve adequate nutritional intake will improve  Description: Ability to achieve adequate nutritional intake will improve  Outcome: Ongoing  Note: Patient eating well and had 100% snack  Goal: Ability to demonstrate self-control will improve  Description: Ability to demonstrate self-control will improve  Outcome: Ongoing  Note: Patient remained in control and had appropriate behaviors during interactions     Problem:  Activity:  Goal: Sleeping patterns will improve  Description: Sleeping patterns will improve  Outcome: Ongoing  Note: Patient resting quietly with no distress noted     Problem: Discharge Planning:  Goal: Discharged to appropriate level of care  Description: Discharged to appropriate level of care  Outcome: Ongoing  Note: Patient to be discharged back to the [address] [place] [address]     Problem: Pain:  Goal: Pain level will decrease  Description: Pain level will decrease  Outcome: Ongoing  Note: Patient denies pain or discomfort  Goal: Control of acute pain  Description: Control of acute pain  Outcome: Ongoing  Note: Patient denies pain or discomfort  Goal: Control of chronic pain  Description: Control of chronic pain  Outcome: Ongoing  Note: Patient denies pain or discomfort     Problem: Altered Mood, Manic Behavior:  Goal: Able to verbalize decrease in frequency and intensity of racing thoughts  Description: Able to verbalize decrease in frequency and intensity of racing thoughts  Outcome: Not Met This Shift  Note: Patient continues to have racing thoughts about God and demons  Goal: Ability to interact with others will improve  Description: Ability to interact with others will improve  Outcome: Not Met This Shift  Note: Patient remained isolative to his room, he was cooperative with staff and with assessment   Care plan reviewed with patient and verbalize understanding of the plan of care and contribute to goal setting.

## 2022-02-08 PROCEDURE — 6370000000 HC RX 637 (ALT 250 FOR IP): Performed by: PSYCHIATRY & NEUROLOGY

## 2022-02-08 PROCEDURE — 1240000000 HC EMOTIONAL WELLNESS R&B

## 2022-02-08 RX ADMIN — CARIPRAZINE 6 MG: 3 CAPSULE, GELATIN COATED ORAL at 08:50

## 2022-02-08 ASSESSMENT — PAIN SCALES - GENERAL
PAINLEVEL_OUTOF10: 0
PAINLEVEL_OUTOF10: 0

## 2022-02-08 NOTE — GROUP NOTE
Group Therapy Note    Date: 2/8/2022    Group Start Time: 0945  Group End Time: 4314  Group Topic: Healthy Living/Wellness    [facility] Adult [facility] 4E    Swati Lackey        Group Therapy Note    Attendees: This writer held a relaxation session followed by a discussion on the Emotions Anonymous thought for the day and read the Just for Today meditation. Patient's Goal: \"To use mouthwash. \"     Notes:  Patient fully participated in the relaxation activity and the discussion following. Status After Intervention:  Improved    Participation Level:  Active Listener and Interactive    Participation Quality: Appropriate, Attentive and Sharing      Speech:  normal      Thought Process/Content: Linear      Affective Functioning: Congruent      Mood: anxious and euthymic      Level of consciousness:  Alert and Attentive      Response to Learning: Able to verbalize current knowledge/experience, Able to verbalize/acknowledge new learning and Capable of insight      Endings: None Reported    Modes of Intervention: Education, Clarifying, Activity, Movement and Media      Discipline Responsible: [address]      Signature:  Luiza Godfrey

## 2022-02-08 NOTE — PROGRESS NOTES
Discharge planning-Art is scheduled with a follow up, by phone, with Dr. Betsy Fabian on 2/15/22 at 2:40 pm.

## 2022-02-08 NOTE — PROGRESS NOTES
tablet 50 mg, 50 mg, Oral, Nightly PRN    ASSESSMENT  Schizophrenia ([facility] [address]     PLAN  Patient's symptoms are improving some  Continue with current medications. Attempt to develop insight  Psycho-education conducted. Supportive Therapy conducted.

## 2022-02-08 NOTE — PROGRESS NOTES
Alert and oriented x 3. Speech clear. Pleasant and cooperative with care and unit routine. States that he has visions and hears God's and demon's voices. Feels that the voices that make things come out of his mouth and talk are the worst.  Religiously preoccupied. Initially stated that his arms were in pain and rated it an 8/10. Then closed his eyes and stated that God's mercy had came down, filled him up, and the pain was gone. Did eat a snack, otherwise isolated to room all evening.

## 2022-02-08 NOTE — PROGRESS NOTES
Patient alert and oriented x 3, not to situation. Patient rates mood \"good\", denies feeling anxious and depression, religiously preoccupied, reports his sheets have \"unclean spirits in the bed\", patient reports he unable to brush is teeth in the bathroom because the \"water is unholy\". Patient reports there are \"unclean spirits\" on the thermometer. Patient attending group, on the fringe with peers.

## 2022-02-09 PROCEDURE — 6370000000 HC RX 637 (ALT 250 FOR IP): Performed by: PSYCHIATRY & NEUROLOGY

## 2022-02-09 PROCEDURE — 1240000000 HC EMOTIONAL WELLNESS R&B

## 2022-02-09 RX ADMIN — CARIPRAZINE 6 MG: 3 CAPSULE, GELATIN COATED ORAL at 08:52

## 2022-02-09 ASSESSMENT — PAIN SCALES - GENERAL
PAINLEVEL_OUTOF10: 0
PAINLEVEL_OUTOF10: 3

## 2022-02-09 NOTE — PLAN OF CARE
Problem: Altered Mood, Manic Behavior:  Goal: Able to sleep  Description: Able to sleep  Outcome: Ongoing  Note: Pt voices no concerns regarding sleep at this time, will continue to monitor. Goal: Able to verbalize decrease in frequency and intensity of racing thoughts  Description: Able to verbalize decrease in frequency and intensity of racing thoughts  Outcome: Ongoing  Note: Pt continues to have racing thoughts about God, angels and demons. Goal: Ability to disclose and discuss suicidal ideas will improve  Description: Ability to disclose and discuss suicidal ideas will improve  Outcome: Ongoing  Note: Pt denies suicidal thoughts and ideas at this time, pt is encouraged to seek medical staff if thoughts do become present. Goal: Absence of self-harm  Description: Absence of self-harm  Outcome: Ongoing  Note: Pt has remained free of self-harm so far this shift, pt is encouraged to seek medical staff if thoughts of self harm do occur and pt remains on every 15 minute checks for safety and reassurance. Goal: Ability to achieve adequate nutritional intake will improve  Description: Ability to achieve adequate nutritional intake will improve  Outcome: Met This Shift  Note: Pt has been able to achieve adequate nutritional intake, pt has been eating meals and snacks. Goal: Ability to interact with others will improve  Description: Ability to interact with others will improve  Outcome: Ongoing  Note: Pt has not been interacting with others he has been isolate to his room and bed aside for needs and snacks. [  Goal: Ability to demonstrate self-control will improve  Description: Ability to demonstrate self-control will improve  Outcome: Ongoing  Note: Pt came out for snack and did have an outburst, pt was able to be redirected and did go back to his room. Will continue to monitor. Problem:  Activity:  Goal: Sleeping patterns will improve  Description: Sleeping patterns will improve  Outcome: Ongoing Problem: Discharge Planning:  Goal: Discharged to appropriate level of care  Description: Discharged to appropriate level of care  Outcome: Ongoing  Note: Discharge planner working with pt to achieve optimal plan of discharge specific to the needs of the pt. Pt has a follow up appointment scheduled with Dr. Manuel Mack on 2/15/22 at 2:40 pm via telephone. Pt does not wish to return to the [facility] and states he will call his father to see if he can stay with him. Problem: Pain:  Goal: Pain level will decrease  Description: Pain level will decrease  Outcome: Ongoing  Note: Pt denies pain at this time, will continue to monitor. Goal: Control of acute pain  Description: Control of acute pain  Outcome: Ongoing  Goal: Control of chronic pain  Description: Control of chronic pain  Outcome: Ongoing       Care plan reviewed with patient and does verbalize understanding of the plan of care and contribute to goal setting.

## 2022-02-09 NOTE — BH NOTE
This RN has reviewed and agrees with Jeannette Damon LPN's data collection and has collaborated with this LPN regarding the patient's care plan.

## 2022-02-09 NOTE — PROGRESS NOTES
Daily Progress Note  Lawanda Almonte MD  2/9/2022    Reviewed patient's current plan of care and vital signs with nursing staff. Sleep: 2 hours last night   Attending groups: Yes  No reported Suicidal or homicidal thought; he is still delusional but not related to having unholy water in the sink. He was able to brush his teeth; he denies having unclean spirits in his room. He is still religiously preoccupied and not able to express those delusions. Mood 8 on a scale of 1 to 10 with 10 is feeling normal.    SUBJECTIVE:    Patient is feeling somewhat better. SUICIDAL IDEATION denies suicidal ideation. Patient does not have medication side effects. ROS: Patient has new complaints:  No  Sleeping adequately:  No; he told staff he slept a lot during the day and he does not want to take any other medications including trazodone. He is still taking Vraylar. Visitors: No    Mental Status Examination:  Patient is cooperative. Speech: Normal rate and tone. No abnormal movements, tics or mannerisms. Mood euthymic; affect flat affect. Suicidal ideation Absent. Homicidal ideations Absent. Hallucinations Absent. Delusions Present. Thought Content: delusional. Thought Processes: Loose Associations. Alert and oriented X 3. Attention and concentration fair. MEMORY intact. Insight and Judgement impaired insight. Data   height is 5' 9\" (1.753 m) and weight is 150 lb (68 kg). His oral temperature is 97.3 °F (36.3 °C). His blood pressure is 120/75 and his pulse is 97. His respiration is 16 and oxygen saturation is 94%.    Labs:            Medications  Current Facility-Administered Medications: cariprazine hcl (VRAYLAR) capsule 6 mg, 6 mg, Oral, Daily  acetaminophen (TYLENOL) tablet 650 mg, 650 mg, Oral, Q4H PRN  ibuprofen (ADVIL;MOTRIN) tablet 400 mg, 400 mg, Oral, Q6H PRN  hydrOXYzine (ATARAX) tablet 50 mg, 50 mg, Oral, TID PRN  magnesium hydroxide (MILK OF MAGNESIA) 400 MG/5ML suspension 30 mL, 30 mL, Oral, Daily PRN  aluminum & magnesium hydroxide-simethicone (MAALOX) 200-200-20 MG/5ML suspension 30 mL, 30 mL, Oral, Q6H PRN  traZODone (DESYREL) tablet 50 mg, 50 mg, Oral, Nightly PRN    ASSESSMENT  Schizophrenia (HCC)     PLAN  Patient's symptoms are improving some  Continue with current medications, encourage to take trazodone. Attempt to develop insight  Psycho-education conducted. Supportive Therapy conducted.

## 2022-02-09 NOTE — PROGRESS NOTES
Patient reports mood is better today \"8\" out of 10, denies suicidal ideations \"it's a sin, it would be more painful for me\", patient in unable to express thoughts at times. Patient denies having unclean spirits in room today. Patient reports \"I brushed my teeth today, I cleaned the sink and got rid of some stains that was there from before I was admitted\". Reports hearing demons and angels last night, denies this am. Patient attending group.

## 2022-02-09 NOTE — FLOWSHEET NOTE
02/09/22 1108   Provider Notification   Reason for Communication Review case   Provider Name Dr. Jayna Alves   Provider Notification Physician   Method of Communication Secure Message   Response Waiting for response   Notification Time 21    Message sent regarding private room due to being paranoid and delusions.

## 2022-02-10 PROCEDURE — 6370000000 HC RX 637 (ALT 250 FOR IP): Performed by: PSYCHIATRY & NEUROLOGY

## 2022-02-10 PROCEDURE — 1240000000 HC EMOTIONAL WELLNESS R&B

## 2022-02-10 RX ORDER — TRAZODONE HYDROCHLORIDE 100 MG/1
100 TABLET ORAL NIGHTLY
Status: DISCONTINUED | OUTPATIENT
Start: 2022-02-10 | End: 2022-02-11 | Stop reason: HOSPADM

## 2022-02-10 RX ADMIN — CARIPRAZINE 6 MG: 3 CAPSULE, GELATIN COATED ORAL at 08:10

## 2022-02-10 RX ADMIN — TRAZODONE HYDROCHLORIDE 100 MG: 100 TABLET ORAL at 20:43

## 2022-02-10 RX ADMIN — TRAZODONE HYDROCHLORIDE 50 MG: 50 TABLET ORAL at 01:15

## 2022-02-10 ASSESSMENT — PAIN SCALES - GENERAL
PAINLEVEL_OUTOF10: 0
PAINLEVEL_OUTOF10: 0

## 2022-02-10 NOTE — PROGRESS NOTES
Daily Progress Note  Ila Mills MD  2/10/2022    Reviewed patient's current plan of care and vital signs with nursing staff. Sleep: 3.5 hours last night broken  Attending groups: Yes  No reported Suicidal or homicidal thought; he is still religiously preoccupied but he is somewhat redirectable and is not agitated; questionable visual hallucination last night. He reports hearing voices at times but cannot make out what the voices are saying. Mood 8 on a scale of 1 to 10 with 10 is feeling normal.    SUBJECTIVE:    Patient is feeling better. SUICIDAL IDEATION denies suicidal ideation. Patient does not have medication side effects. ROS: Patient has new complaints:  No  Sleeping adequately:  No, even with 50 mg of trazodone last night  Visitors: No    Mental Status Examination:  Patient is cooperative. Speech: Normal rate and tone. No abnormal movements, tics or mannerisms. Mood euthymic; affect flat affect. Suicidal ideation Absent. Homicidal ideations Absent. Hallucinations Absent. Delusions Present. Thought Content: delusional. Thought Processes: Circumstantial. Alert and oriented X 3. Attention and concentration fair. MEMORY intact. Insight and Judgement impaired insight. Data   height is 5' 9\" (1.753 m) and weight is 150 lb (68 kg). His temperature is 96.5 °F (35.8 °C). His blood pressure is 115/76 and his pulse is 82. His respiration is 18 and oxygen saturation is 99%.    Labs:            Medications  Current Facility-Administered Medications: cariprazine hcl (VRAYLAR) capsule 6 mg, 6 mg, Oral, Daily  acetaminophen (TYLENOL) tablet 650 mg, 650 mg, Oral, Q4H PRN  ibuprofen (ADVIL;MOTRIN) tablet 400 mg, 400 mg, Oral, Q6H PRN  hydrOXYzine (ATARAX) tablet 50 mg, 50 mg, Oral, TID PRN  magnesium hydroxide (MILK OF MAGNESIA) 400 MG/5ML suspension 30 mL, 30 mL, Oral, Daily PRN  aluminum & magnesium hydroxide-simethicone (MAALOX) 200-200-20 MG/5ML suspension 30 mL, 30 mL, Oral, Q6H PRN  traZODone (DESYREL) tablet 50 mg, 50 mg, Oral, Nightly PRN    ASSESSMENT  Schizophrenia ([facility] [address]     PLAN  Patient's symptoms are improving some  Continue with current medications; increase trazodone. Attempt to develop insight  Psycho-education conducted. Supportive Therapy conducted.   Probable discharge is tomorrow  Follow-up @ [employer]

## 2022-02-10 NOTE — FLOWSHEET NOTE
Spiritual Support Group Note    Number of Participants in Group:9                           Time: 1430    Goal: Relief from isolation and loneliness             Paris Sharing             Self-understanding and gain insight              Acceptance and belonging            Recognize they are not alone                Socialization             Empowerment       Encouragement    Topic:  [] Spiritual Wellness and Self Care                  [x] Hope                     [x] Connecting with Divine/Others        [x] Thankfulness and Gratitude               [x]  Meaningfulness and Purpose               [] Forgiveness               [] Peace               [] Connect to [facility]     [] Other:    Participation Level:   [x] Active Listener   [] Minimal   [] Monopolizing   [x] Interactive   [] No Participation   []  Other:     Attention:   [x] Alert   [] Distractible   [] Drowsy   [] Poor   [] Other:    Manner:   [x] Cooperative   [] Suspicious   [] Withdrawn   [] Guarded   [] Irritable   [] Inhospitable   [] Other:     Others Comments from Group: Yadi Styles participated in the spirituality group. Father John Edwards was also in the group. Today in group we talked about stress, and coping mechanism. Sung Smith shared about his demons and how much he trusts in [facility].  (This was shared with his nurse).

## 2022-02-10 NOTE — PROGRESS NOTES
[address] [place] [address]  Day 7/Weekly Interdisciplinary Treatment Plan NOTE    Patient was in treatment team    Admission Type:   Admission Type: Probate    Reason for admission:  Reason for Admission: Schizoaffective disorder  Estimated Length of Stay Update:  02/17/22  Estimated Discharge Date Update: 1-3 days    PATIENT STRENGTHS:  Patient Strengths Strengths: No significant Physical Illness,Connection to output provider  Patient Strengths and Limitations:Limitations: External locus of control,Difficult relationships / poor social skills  Addictive Behavior:   Medical Problems:  Past Medical History:   Diagnosis Date    Depression     Hallucination     Paranoid behavior ([facility] [address]     Schizoaffective disorder ([facility] [address]        Risk:  Fall RiskTotal: 59  Aravind Scale Aravind Scale Score: 22  BVC        Status EXAM:   Status and Exam  Normal: No  Facial Expression: Flat  Affect: Blunt  Level of Consciousness: Alert  Mood:Normal: No  Mood: Anxious  Motor Activity:Normal: No  Motor Activity: Increased  Interview Behavior: Cooperative  Preception: [place] to Person,[place] to Time,[place] to Place  Attention:Normal: No  Attention: Unable to Concentrate  Thought Processes: Loose Assoc. Thought Content:Normal: No  Thought Content: Preoccupations,Delusions  Hallucinations: Auditory (Comment),Visual (Comment)  Delusions: Yes  Delusions: Other(See Comment) (Buddhist)  Memory:Normal: No  Memory: Poor Recent  Insight and Judgment: No  Insight and Judgment: Poor Judgment,Poor Insight  Present Suicidal Ideation: No  Present Homicidal Ideation: No    Daily Assessment Last Entry:   Daily Sleep (WDL): Exceptions to WDL         Patient Currently in Pain: Denies  Daily Nutrition (WDL): Within Defined Limits    Patient Monitoring:  Frequency of Checks: 4 times per hour, close    Psychiatric Symptoms:   Depression Symptoms  Depression Symptoms: No problems reported or observed.   Anxiety Symptoms  Anxiety Symptoms: Generalized  Skylar Symptoms  Skylar Symptoms: No problems reported or observed. Psychosis Symptoms  Delusion Type: Paranoid,Hindu    Suicide Risk CSSR-S:  1) Within the past month, have you wished you were dead or wished you could go to sleep and not wake up? : No  2) Have you actually had any thoughts of killing yourself? : No  3) Have you been thinking about how you might kill yourself? : No  5) Have you started to work out or worked out the details of how to kill yourself? Do you intend to carry out this plan? : No  6) Have you ever done anything, started to do anything, or prepared to do anything to end your life?: Yes        EDUCATION:   Learner Progress Toward Treatment Goals: Reviewed results and recommendations of this team, Reviewed group plan and strategies, Reviewed signs, symptoms and risk of self harm and violent behavior and Reviewed goals and plan of care    Method: Individual    Outcome: Verbalized understanding and Needs reinforcement    PATIENT GOALS: Stabilize pt symptoms. PLAN/TREATMENT RECOMMENDATIONS UPDATE:  1. Are there any lingering problems that need to be addressed? Patient continues to be religiously preoccupied however this may be his baseline. Pt states that the positive spiritual energies are much greater today and that the daemons are no longer causing he problems. He is pleasant and cooperative    2. Discharge plans that are set-up or in process Pt will return to [facility]. Follow up with Jeanette Akhtar is scheduled. Patient does have a legal guardian.      GOALS UPDATE:   Time frame for Short-Term Goals: Daily      HANSEL Ervin

## 2022-02-10 NOTE — GROUP NOTE
Group Therapy Note    Date: 2/10/2022    Group Start Time: 9978  Group End Time: 1430  Group Topic: Healthy Living/Wellness    [facility] 4E    Nickie Angelo RN        Notes:  Patient attended nursing group. Status After Intervention:  Unchanged    Participation Level:  Active Listener and Interactive    Participation Quality: Appropriate      Speech:  normal      Thought Process/Content: Logical      Affective Functioning: Congruent      Mood: euthymic      Level of consciousness:  Alert and Oriented x4      Response to Learning: Able to retain information      Endings: None Reported    Modes of Intervention: Support, Socialization and Activity      Discipline Responsible: Registered Nurse      Signature:  Nickie Angelo RN

## 2022-02-10 NOTE — PLAN OF CARE
Problem: Altered Mood, Manic Behavior:  Goal: Able to sleep  Description: Able to sleep  Outcome: Not Met This Shift  Note: Patient slept 3.5 hours last night, reports they  slept okay. Encourage patient not to take naps during the day, relax several hours before bed and to take prescribed sleep meds as ordered. Patient  verbalized understanding. Goal: Able to verbalize decrease in frequency and intensity of racing thoughts  Description: Able to verbalize decrease in frequency and intensity of racing thoughts  Outcome: Not Met This Shift  Note: Pt still has racing thoughts  Goal: Ability to interact with others will improve  Description: Ability to interact with others will improve  Outcome: Not Met This Shift  Note: Pt is on the fringe with peers     Problem: Altered Mood, Psychotic Behavior:  Goal: Able to verbalize decrease in frequency and intensity of hallucinations  Description: Able to verbalize decrease in frequency and intensity of hallucinations  Outcome: Not Met This Shift  Note: Pt continues to have hallucinations  Goal: Able to verbalize reality based thinking  Description: Able to verbalize reality based thinking  Outcome: Not Met This Shift  Note: Pt remains delusional     Problem: Activity:  Goal: Sleeping patterns will improve  Description: Sleeping patterns will improve  Outcome: Not Met This Shift  Note: Patient slept 3.5 hours last night, reports they  slept okay. Encourage patient not to take naps during the day, relax several hours before bed and to take prescribed sleep meds as ordered. Patient  verbalized understanding. Problem: Discharge Planning:  Goal: Discharged to appropriate level of care  Description: Discharged to appropriate level of care  Outcome: Not Met This Shift  Note: Discharge planners working with patient to achieve optimal discharge plan, specific to the needs of this patient.       Problem: Pain:  Goal: Pain level will decrease  Description: Pain level will decrease  Outcome: Met This Shift  Note: Pt had no C/O pain, will continue to monitor   Care plan reviewed with patient.   Patient does verbalize understanding of the plan of care and does contribute to goal setting

## 2022-02-10 NOTE — PLAN OF CARE
Problem: Altered Mood, Manic Behavior:  Goal: Able to verbalize decrease in frequency and intensity of racing thoughts  Description: Able to verbalize decrease in frequency and intensity of racing thoughts  Outcome: Met This Shift  Note: Thoughts slower this shift      Problem: Pain:  Goal: Pain level will decrease  Description: Pain level will decrease  Outcome: Met This Shift  Note: Denies pain this shift, states that God has taken it away     Problem: Altered Mood, Manic Behavior:  Goal: Able to sleep  Description: Able to sleep  2/9/2022 2055 by Christina Umaña RN  Outcome: Ongoing  Note: Slept poorly last night, declined trazodone this shift   2/9/2022 2054 by Christina Umaña RN  Outcome: Not Met This Shift  Goal: Ability to interact with others will improve  Description: Ability to interact with others will improve  Outcome: Ongoing  Note: Isolates to room, on the fringe when out on unit      Problem: Activity:  Goal: Sleeping patterns will improve  Description: Sleeping patterns will improve  Outcome: Ongoing  Note: Declined trazodone this shift     Problem: Discharge Planning:  Goal: Discharged to appropriate level of care  Description: Discharged to appropriate level of care  Outcome: Ongoing  Note: Works with an interdisciplinary team towards meeting discharge needs      Problem: Altered Mood, Psychotic Behavior:  Goal: Able to verbalize decrease in frequency and intensity of hallucinations  Description: Able to verbalize decrease in frequency and intensity of hallucinations  Outcome: Not Met This Shift  Note: States that he has been hearing voices. States that he hears demons talking to him. Has been seeing \"floating things\" today. Goal: Able to verbalize reality based thinking  Description: Able to verbalize reality based thinking  Outcome: Not Met This Shift  Note: Paranoid and suspicious.   Religiously preoccupied      Problem: Altered Mood, Manic Behavior:  Goal: Ability to disclose and discuss suicidal ideas will improve  Description: Ability to disclose and discuss suicidal ideas will improve  Outcome: Completed  Note: Denies suicidal thoughts or plans   Goal: Absence of self-harm  Description: Absence of self-harm  Outcome: Completed  Note: No self harm thoughts, plans, or behaviors   Goal: Ability to achieve adequate nutritional intake will improve  Description: Ability to achieve adequate nutritional intake will improve  Outcome: Completed  Note: Eating well  Goal: Ability to demonstrate self-control will improve  Description: Ability to demonstrate self-control will improve  Outcome: Completed  Note: No outbursts, good self control noted      Problem: Pain:  Goal: Control of acute pain  Description: Control of acute pain  Outcome: Completed  Goal: Control of chronic pain  Description: Control of chronic pain  Outcome: Completed    Care plan reviewed with patient. Patient verbalize understanding of the plan of care and contribute to goal setting.

## 2022-02-11 VITALS
HEIGHT: 69 IN | TEMPERATURE: 97.7 F | BODY MASS INDEX: 22.22 KG/M2 | SYSTOLIC BLOOD PRESSURE: 131 MMHG | HEART RATE: 89 BPM | RESPIRATION RATE: 16 BRPM | OXYGEN SATURATION: 96 % | WEIGHT: 150 LBS | DIASTOLIC BLOOD PRESSURE: 82 MMHG

## 2022-02-11 PROCEDURE — 6370000000 HC RX 637 (ALT 250 FOR IP): Performed by: PSYCHIATRY & NEUROLOGY

## 2022-02-11 RX ORDER — TRAZODONE HYDROCHLORIDE 100 MG/1
100 TABLET ORAL NIGHTLY PRN
Qty: 30 TABLET | Refills: 0 | Status: SHIPPED | OUTPATIENT
Start: 2022-02-11

## 2022-02-11 RX ADMIN — CARIPRAZINE 6 MG: 3 CAPSULE, GELATIN COATED ORAL at 08:40

## 2022-02-11 ASSESSMENT — PAIN SCALES - GENERAL: PAINLEVEL_OUTOF10: 0

## 2022-02-11 NOTE — PROGRESS NOTES
[facility] [phone] spoke with charge nurse Kendal regarding discharge medications and discharge today. Kendal shared concerns of medications that were not continued that pt was on PTA. Informed I would contact Dr. Jessenia Luciano. Called Dr. Jessenia Luciano [phone] regarding discharge medications and  shared he has the medications listed he wants continued. Dr. Jessenia Luciano will not continue Cogentin,Buspar or Lorazepam as pt is doing well on current medications and believes more medications will promote non compliance.    Returned call to [facility] spoke with Jasmin Ewing informed Dr. Jessenia Luciano will not be adding any additional medications at this time  Med list faxed [phone] to [facility] attention Jasmin Ewing

## 2022-02-11 NOTE — PROGRESS NOTES
Daily Progress Note  Ryan Prieto MD  2/11/2022    Reviewed patient's current plan of care and vital signs with nursing staff. Sleep: 6.5 hours last night broken  Attending groups: Yes to some  No reported Suicidal or homicidal thought; he is still religiously preoccupied but somewhat redirectable; he believes one of his peers has demons on her hair and tried to stay away from her. He has auditory hallucinations at times but cannot make out what the voices are saying. Mood 8 on a scale of 1 to 10 with 10 is feeling normal.    SUBJECTIVE:    Patient is feeling better. SUICIDAL IDEATION denies suicidal ideation. Patient does not have medication side effects. ROS: Patient has new complaints:  No  Sleeping adequately: Yes  Visitors: No    Mental Status Examination:  Patient is cooperative. Speech: Normal rate and tone. No abnormal movements, tics or mannerisms. Mood euthymic; affect flat affect. Suicidal ideation Absent. Homicidal ideations Absent. Hallucinations Absent. Delusions Present. Thought Content: delusional. Thought Processes: Circumstantial. Alert and oriented X 3. Attention and concentration fair. MEMORY intact. Insight and Judgement impaired insight. Data   height is 5' 9\" (1.753 m) and weight is 150 lb (68 kg). His tympanic temperature is 98.2 °F (36.8 °C). His blood pressure is 116/84 and his pulse is 89. His respiration is 16 and oxygen saturation is 96%.    Labs:            Medications  Current Facility-Administered Medications: traZODone (DESYREL) tablet 100 mg, 100 mg, Oral, Nightly  cariprazine hcl (VRAYLAR) capsule 6 mg, 6 mg, Oral, Daily  acetaminophen (TYLENOL) tablet 650 mg, 650 mg, Oral, Q4H PRN  ibuprofen (ADVIL;MOTRIN) tablet 400 mg, 400 mg, Oral, Q6H PRN  hydrOXYzine (ATARAX) tablet 50 mg, 50 mg, Oral, TID PRN  magnesium hydroxide (MILK OF MAGNESIA) 400 MG/5ML suspension 30 mL, 30 mL, Oral, Daily PRN  aluminum & magnesium hydroxide-simethicone (MAALOX) 200-200-20 MG/5ML suspension 30 mL, 30 mL, Oral, Q6H PRN    ASSESSMENT  Schizophrenia ([facility] [address]     PLAN  Patient's symptoms are improving  Continue with current medications. Attempt to develop insight  Psycho-education conducted. Supportive Therapy conducted.   Probable discharge is today  Follow-up @ [employer]

## 2022-02-11 NOTE — PROGRESS NOTES
Behavioral Health   Discharge Note    Pt discharged with followings belongings:   Dental Appliances: None  Vision - Corrective Lenses: None  Hearing Aid: None  Jewelry: None  Body Piercings Removed: N/A  Clothing: Jacket / coat,Pants,Shirt (belt)  Were All Patient Medications Collected?: Yes  Other Valuables: Cell phone,Wallet,Keys   Valuables sent home with patient. Medications obtained from safe, Security envelope number:  [id number] returned to patient. Patient left department with transport via ambulatory. Discharged to [facility]. \"An Important Message from [employer] About Your Rights\" form photocopy original from admission and provided to pt at discharge yes. Patient education on aftercare instructions: yes  Bridge appointment completed: yes. Reviewed Discharge Instructions with patient/family/nursing facility. Patient/family verbalizes understanding and agreement with the discharge plan using the teachback method. Information faxed to [facility]  Patient/family verbalize understanding of AVS:  yes. Status EXAM upon discharge:  Status and Exam  Normal: No  Facial Expression: Flat  Affect: Blunt  Level of Consciousness: Alert  Mood:Normal: No  Mood: Anxious,Suspicious  Motor Activity:Normal: Yes  Motor Activity: Increased  Interview Behavior: Cooperative  Preception: Radiant to Person,Radiant to Time,Radiant to Place  Attention:Normal: No  Attention: Unable to Concentrate  Thought Processes: Blocking  Thought Content:Normal: No  Thought Content: Preoccupations,Delusions  Hallucinations: Auditory (Comment)  Delusions: Yes  Delusions:  Other(See Comment) ([facility])  Memory:Normal: No  Memory: Poor Recent  Insight and Judgment: No  Insight and Judgment: Poor Judgment,Poor Insight  Present Suicidal Ideation: No  Present Homicidal Ideation: No     Pt understands he is an approved discharge today returning to [facility], His guardian Lucy Velasquez was notified and gave his permission for this discharge.      Xin Freeman RN

## 2022-02-11 NOTE — PROGRESS NOTES
Called guardian Karissa Hernandez [phone] regarding discharge plans today. Provided update regarding discharge, medications and returning to [facility] had Sofia Tapia RN verify permission.

## 2022-02-11 NOTE — DISCHARGE INSTR - COC
Continuity of Care Form    Patient Name: Leslie Lan   :  1985  MRN:  [id number]    Admit date:  2022  Discharge date:  2022    Code Status Order: Full Code   Advance Directives:      Admitting Physician:  Soco Fernandez MD  PCP: Mary Muro MD    Discharging Nurse: Francisco Sears RN  [address] Unit/Room#: 8Q-68/995-D  Discharging Unit Phone Number: [phone] option 2    Emergency Contact:   Extended Emergency Contact Information  Primary Emergency Contact: Maisha Young of [address] Phone: [phone]  Relation: Parent  Secondary Emergency Contact: Stephanie Michelle of [address] Phone: [phone]  Relation: Parent    Past Surgical History:  Past Surgical History:   Procedure Laterality Date    NOSE SURGERY      ORTHOPEDIC SURGERY      pt said that he has had surgery on right knee/ shin area. unspecified procedure. Immunization History: There is no immunization history on file for this patient.     Active Problems:  Patient Active Problem List   Diagnosis Code    Schizophrenia, paranoid, chronic with acute exacerbation ([facility] [address] F20.0    Schizoaffective disorder, depressive type ([facility] [address] F25.1    Schizophrenia ([facility] [address] F20.9       Isolation/Infection:   Isolation            No Isolation          Patient Infection Status       Infection Onset Added Last Indicated Last Indicated By Review Planned Expiration Resolved Resolved By    None active    Resolved    COVID-19 (Rule Out) 22 COVID-19, Rapid (Ordered)   22 Rule-Out Test Resulted            Nurse Assessment:  Last Vital Signs: /82   Pulse 89   Temp 97.7 °F (36.5 °C) (Tympanic)   Resp 16   Ht 5' 9\" (1.753 m)   Wt 150 lb (68 kg)   SpO2 96%   BMI 22.15 kg/m²     Last documented pain score (0-10 scale): Pain Level: 0  Last Weight:   Wt Readings from Last 1 Encounters:   22 150 lb (68 kg)     Mental Status:  alert    IV Access:  - None    Nursing Christina RAY    PHYSICIAN SECTION    Prognosis: {Prognosis:4169442943}    Condition at Discharge: [address] Patient Condition:327026126}    Rehab Potential (if transferring to Rehab): {Prognosis:7732005758}    Recommended Labs or Other Treatments After Discharge: ***    Physician Certification: I certify the above information and transfer of Jefferson Barnes  is necessary for the continuing treatment of the diagnosis listed and that he requires {Admit to Appropriate Level of Care:11060} for {GREATER/LESS:585627747} 30 days.      Update Admission H&P: {CHP DME Changes in EVGYM:285771450}    PHYSICIAN SIGNATURE:  {Esignature:889886798}

## 2022-02-11 NOTE — DISCHARGE SUMMARY
Physician Discharge Summary     Patient ID:  Alondra Villalba  [id number]  72 y.o.  1985    Admit date: 2/2/2022    Discharge date and time: 2/11/2022  8:53 AM     Admitting Physician: Brenna Ashford MD     Discharge Physician: Jordon Castillo MD      Admission Diagnoses: Schizoaffective disorder, depressive type ([facility] [F25.1]  Schizophrenia, acute ([facility] [F23]  Schizophrenia ([facility] [F20.9]    IDENTIFYING INFORMATION: The patient is a 34-year-old single   male. He has no children. He is a resident of [place] [address]. He  is disabled. HISTORY OF PRESENT ILLNESS: The patient was brought to [address] [place] [address] ED under a probate order from [facility]. The  patient was probated due to his behavior. He has a long history of  schizophrenia. He has been refusing his psychotropics. He is very  psychotic and he is religiously preoccupied. He talks about mental  communication. He feels like demons are entering his mind and he is  hearing spiritual voice. He talks about Rohini [facility] salvation to  darkness. He has been at the skilled nursing for many years, but due to his  behavior, he was probated. He denies feeling depressed or anxious. According to his nurse at the skilled nursing, he has been on multiple  psychotropics and he has been refusing them. MENTAL STATUS EXAMINATION AT ADMISSION: See H and P. Discharge Diagnoses:   Schizophrenia [facility])     Past Medical History:   Diagnosis Date    Depression     Hallucination     Paranoid behavior ([facility]     Schizoaffective disorder ([facility]         Admission Condition: poor    Discharged Condition: stable    Indication for Admission: threat to self    Significant Diagnostic Studies:   See Results Review tab in EHR      TREATMENT AND CLINICAL COURSE:   Patient was admitted on the unit. Routine lab was ordered. He was supposed to go to [facility] for psychiatry due to possible violence, unfortunately that transfer was not possible.   He did not have violent behavior in the emergency room and he was then admitted on our unit. At admission, patient was started on Vraylar which was titrated to 6 mg daily with a lot of encouragement; Hydroxyzine & Trazodone were added. He did not want to take any psychotropics saying he has side effects from them but with a lot of encouragement he was agreeable to take Vraylar which he has never taken before. He did not take hydroxyzine. He was having trouble sleeping. Once again he was encouraged to take trazodone which was increased to 100 mg at bedtime. Of note he was very psychotic upon admission. Patient did not have side effect from medications. Patient was involved in group and milieu therapy. Patient did not have suicidal thought during this hospital stay. Toward the end of the hospital stay, patient become more hopeful. He was no longer having hallucinations. He was delusional but easily redirected. Overall, hospital stay was uncomplicated, and patient was discharged in stable condition. Consults: none    Treatments: Psychotropic medications, therapy with group, milieu, and 1:1 with nurses, social workers and Attending physician.       Discharge Medications:  Current Discharge Medication List      START taking these medications    Details   cariprazine hcl (VRAYLAR) 6 MG CAPS capsule Take 1 capsule by mouth daily  Qty: 30 capsule, Refills: 0         CONTINUE these medications which have CHANGED    Details   traZODone (DESYREL) 100 MG tablet Take 1 tablet by mouth nightly as needed for Sleep  Qty: 30 tablet, Refills: 0         CONTINUE these medications which have NOT CHANGED    Details   Mouthwashes (BIOTENE/CALCIUM PBF) LIQD Take 15 mLs by mouth 3 times daily as needed (dry mouth)  Qty: 1 Bottle, Refills: 1      Multiple Vitamins-Minerals (THERAPEUTIC MULTIVITAMIN-MINERALS) tablet Take 1 tablet by mouth daily         STOP taking these medications       lurasidone (LATUDA) 80 MG TABS tablet Comments:   Reason for Stopping:         LORazepam (ATIVAN) 0.5 MG tablet Comments:   Reason for Stopping:         benztropine (COGENTIN) 2 MG tablet Comments:   Reason for Stopping:         PARoxetine HCl (PAXIL PO) Comments:   Reason for Stopping:         Iloperidone (FANAPT PO) Comments:   Reason for Stopping:                  MENTAL STATUS EXAMINATION AT DISCHARGE: Patient is cooperative. Speech: Normal rate and tone. No abnormal movements, tics or mannerisms. Mood euthymic; affect flat affect. Suicidal ideation Absent. Homicidal ideations Absent. Hallucinations Absent. Delusions Present. Thought Content: delusional. Thought Processes: Circumstantial. Alert and oriented X 3. Attention and concentration fair. MEMORY intact. Insight and Judgement impaired insight. Disposition: Home    Patient Instructions: Activity: As tolerated  Diet: regular diet    Follow-up as scheduled with [address]     Time Spent on discharge in examination, evaluation, counseling and review of medications and discharge plan: More than 30 minutes. Engagement: Patient displayed a fair level of engagement with the treatments offered during this admission.        Signed:  Electronically signed by Rupinder Lantigua MD on 2/11/2022 at 8:53 AM

## 2022-02-11 NOTE — PLAN OF CARE
Problem: Discharge Planning:  Goal: Discharged to appropriate level of care  Description: Discharged to appropriate level of care  2/10/2022 2124 by Ilene Calloway RN  Outcome: Met This Shift  Note: Works with an interdisciplinary team towards meeting discharge needs  2/10/2022 1448 by Sukhjinder Paez RN  Outcome: Not Met This Shift  Note: Discharge planners working with patient to achieve optimal discharge plan, specific to the needs of this patient. Problem: Pain:  Goal: Pain level will decrease  Description: Pain level will decrease  2/10/2022 2124 by Ilene Calloway RN  Outcome: Met This Shift  Note: Denies pain this shift   2/10/2022 1448 by Sukhjinder Paez RN  Outcome: Met This Shift  Note: Pt had no C/O pain, will continue to monitor     Problem: Altered Mood, Manic Behavior:  Goal: Able to sleep  Description: Able to sleep  2/10/2022 2124 by Ilene Calloway RN  Outcome: Ongoing  Note: Took scheduled trazodone this shift   2/10/2022 1448 by Sukhjinder Paez RN  Outcome: Not Met This Shift  Note: Patient slept 3.5 hours last night, reports they  slept okay. Encourage patient not to take naps during the day, relax several hours before bed and to take prescribed sleep meds as ordered. Patient  verbalized understanding. Goal: Able to verbalize decrease in frequency and intensity of racing thoughts  Description: Able to verbalize decrease in frequency and intensity of racing thoughts  2/10/2022 2124 by Ilene Calloway RN  Outcome: Ongoing  Note: States that he has lots of thoughts, but feels that there is a demon inside of him blocking his ability to talk about it. 2/10/2022 1448 by Sukhjinder Paez RN  Outcome: Not Met This Shift  Note: Pt still has racing thoughts  Goal: Ability to interact with others will improve  Description: Ability to interact with others will improve  2/10/2022 2124 by Ilene Calloway RN  Outcome: Ongoing  Note: On the fringe with peers. Paces in the hallway.  Good interactions with nurse.  2/10/2022 1448 by Veronika Sherman RN  Outcome: Not Met This Shift  Note: Pt is on the fringe with peers     Problem: Activity:  Goal: Sleeping patterns will improve  Description: Sleeping patterns will improve  2/10/2022 2124 by Samy Franco RN  Outcome: Ongoing  Note: Took scheduled trazodone for sleep   2/10/2022 1448 by Veronika Sherman RN  Outcome: Not Met This Shift  Note: Patient slept 3.5 hours last night, reports they  slept okay. Encourage patient not to take naps during the day, relax several hours before bed and to take prescribed sleep meds as ordered. Patient  verbalized understanding. Problem: Altered Mood, Psychotic Behavior:  Goal: Able to verbalize decrease in frequency and intensity of hallucinations  Description: Able to verbalize decrease in frequency and intensity of hallucinations  2/10/2022 2124 by Samy Franco RN  Outcome: Ongoing  Note: States that he sees the aura of a demon around a female peer. Hears demonic voices at times. 2/10/2022 1448 by Veronika Sherman RN  Outcome: Not Met This Shift  Note: Pt continues to have hallucinations  Goal: Able to verbalize reality based thinking  Description: Able to verbalize reality based thinking  2/10/2022 2124 by Samy Franco RN  Outcome: Ongoing  Note: Feels that demons are all around him/inside of him. Believes that God is trying to help him. Asking staff to pray with him for God's guidance. 2/10/2022 1448 by Veronika Sherman RN  Outcome: Not Met This Shift  Note: Pt remains delusional     Care plan reviewed with patient. Patient verbalize understanding of the plan of care and contribute to goal setting.

## 2022-02-14 ENCOUNTER — TELEPHONE (OUTPATIENT)
Dept: PSYCHIATRY | Age: 37
End: 2022-02-14

## 2022-02-21 ENCOUNTER — HOSPITAL ENCOUNTER (EMERGENCY)
Age: 37
Discharge: OTHER INSTITUTION | End: 2022-02-21
Payer: MEDICARE

## 2022-02-21 VITALS
BODY MASS INDEX: 22.22 KG/M2 | SYSTOLIC BLOOD PRESSURE: 135 MMHG | RESPIRATION RATE: 20 BRPM | WEIGHT: 150 LBS | DIASTOLIC BLOOD PRESSURE: 89 MMHG | TEMPERATURE: 97.9 F | HEIGHT: 69 IN | OXYGEN SATURATION: 98 % | HEART RATE: 99 BPM

## 2022-02-21 DIAGNOSIS — F20.9 SCHIZOPHRENIA, UNSPECIFIED TYPE (HCC): ICD-10-CM

## 2022-02-21 DIAGNOSIS — T50.905A ADVERSE EFFECT OF DRUG, INITIAL ENCOUNTER: Primary | ICD-10-CM

## 2022-02-21 PROCEDURE — 99283 EMERGENCY DEPT VISIT LOW MDM: CPT

## 2022-02-21 ASSESSMENT — ENCOUNTER SYMPTOMS
RHINORRHEA: 0
SHORTNESS OF BREATH: 0
NAUSEA: 0
VOMITING: 0
ABDOMINAL PAIN: 0
COUGH: 0

## 2022-02-21 ASSESSMENT — PAIN DESCRIPTION - LOCATION: LOCATION: ARM

## 2022-02-21 ASSESSMENT — PAIN SCALES - GENERAL: PAINLEVEL_OUTOF10: 2

## 2022-02-21 ASSESSMENT — PAIN - FUNCTIONAL ASSESSMENT: PAIN_FUNCTIONAL_ASSESSMENT: 0-10

## 2022-02-21 NOTE — PROGRESS NOTES
Patient presents to the ED voluntarily. Patient reports he doesn't like taking his medication because he feels it is unjust for him to be forced to do so. Patient reports he feels that unclean spirits have recently increased around him. Patient would like to move to a place where he has his own bathroom and shower. Patient reports he prefers to be in a facility with no females as it is against his [place]  beliefs to reside with a woman who is not his wife. Patient denies any suicidal/homicidal ideation. Phone call with Shane at [facility]. Shane reports Friday she could tell pt was starting to decompensate already. Patient was refusing to take medications. Patient always has psychosis but believes currently it is hyphenated. Significant hallucinations, believes he is dying and convinces others this is true. Patient has been asking why he can't go to [facility]. Dealing with this on and off since September, goes 4-6 weeks before stopping medications typically. Shane has worked with this patient for six years. Patient explains there is so much noise in his mind that he's unable to do math which shane reports is one of his strong suits. Patient has not been sleeping well. Only took prn trazadone two nights. He's been watching youtube videos to \"cast out the demons\". Shane states patient \"needs long term hospital stay\". Appointment with Dr. Lou Yuen got moved to 2/25/2022. Patient asked if he could come in to the ED today.

## 2022-02-21 NOTE — ED NOTES
Pt appears to be sleeping on cot. Pt remains in safe room for safety with campus police video monitoring.      Geo Traore RN  02/21/22 9119 Sveta Howe RN  02/21/22 0431

## 2022-02-21 NOTE — ED PROVIDER NOTES
[facility] EMERGENCY DEPT      CHIEF COMPLAINT       Chief Complaint   Patient presents with    Mental Health Problem       Nurses Notes reviewed and I agree except as noted in the HPI. HISTORY OF PRESENT ILLNESS    Obi Keane is a 44 y.o. male who presents for medication side effects. Patient was probated and seen in the ER on 2-2-22 and ultimately admitted to the hospital.  Patient was put on a medication Vraylar. He reports feeling weak, tired, and a sense of dying since being put on the medication. He explained that Dr. Kelli Arevalo encouraged him to give the medicine time that the side effects would pass. Patient admits the fatigue has passed however he still has generalized weakness and states \"I feel like I am dying. \"  Patient reports he feels better today because he did not take his medications. He describes this feeling of dying as unclean spirits entering his body. Patient had previously taken another medication which caused a lump on his abdomen and Latuda which caused difficulty swallowing. The patient is currently residing at [facility] which he does not like. Patient endorses mild nasal congestion but denies other physical complaints such as chest pain, abdominal pain, dyspnea, fever, or change in appetite. Patient denies suicidal or homicidal ideation. REVIEW OF SYSTEMS     Review of Systems   Constitutional: Negative for appetite change and fever. HENT: Positive for congestion. Negative for rhinorrhea. Eyes: Negative for visual disturbance. Respiratory: Negative for cough and shortness of breath. Cardiovascular: Negative for chest pain. Gastrointestinal: Negative for abdominal pain, nausea and vomiting. Genitourinary: Negative for decreased urine volume. Musculoskeletal: Negative for gait problem. Skin: Negative for rash and wound. Neurological: Positive for weakness. Negative for headaches. Psychiatric/Behavioral: Positive for hallucinations. Negative for self-injury, sleep disturbance and suicidal ideas. PAST MEDICAL HISTORY    has a past medical history of Depression, Hallucination, Paranoid behavior ([address] and Schizoaffective disorder ([facility] SURGICAL HISTORY      has a past surgical history that includes Nose surgery and orthopedic surgery. CURRENT MEDICATIONS       Previous Medications    CARIPRAZINE HCL (VRAYLAR) 6 MG CAPS CAPSULE    Take 1 capsule by mouth daily    MOUTHWASHES (BIOTENE/CALCIUM PBF) LIQD    Take 15 mLs by mouth 3 times daily as needed (dry mouth)    MULTIPLE VITAMINS-MINERALS (THERAPEUTIC MULTIVITAMIN-MINERALS) TABLET    Take 1 tablet by mouth daily    TRAZODONE (DESYREL) 100 MG TABLET    Take 1 tablet by mouth nightly as needed for Sleep       ALLERGIES     is allergic to zyprexa [olanzapine]. FAMILY HISTORY     He indicated that his mother is alive. He indicated that his father is alive. He indicated that the status of his maternal grandmother is unknown. He indicated that the status of his maternal grandfather is unknown. He indicated that the status of his paternal grandmother is unknown. He indicated that the status of his paternal grandfather is unknown.   family history includes Cancer in his maternal grandmother and paternal grandmother; Depression in his maternal grandmother; Heart Disease in his paternal grandfather; High Cholesterol in his maternal grandfather. SOCIAL HISTORY    reports that he quit smoking about 19 years ago. He has never used smokeless tobacco. He reports that he does not drink alcohol and does not use drugs. PHYSICAL EXAM     INITIAL VITALS:  height is 5' 9\" (1.753 m) and weight is 150 lb (68 kg). His oral temperature is 97.9 °F (36.6 °C). His blood pressure is 135/89 and his pulse is 99. His respiration is 20 and oxygen saturation is 98%. Physical Exam  Vitals and nursing note reviewed. Constitutional:       General: He is not in acute distress.      Appearance: He is well-developed. He is not toxic-appearing or diaphoretic. HENT:      Head: Normocephalic and atraumatic. Right Ear: Hearing normal.      Left Ear: Hearing normal.      Nose: Nose normal. No rhinorrhea. Mouth/Throat:      Pharynx: Uvula midline. No oropharyngeal exudate. Eyes:      General: Lids are normal. No scleral icterus. Conjunctiva/sclera: Conjunctivae normal.      Pupils: Pupils are equal, round, and reactive to light. Neck:      Trachea: No tracheal deviation. Cardiovascular:      Rate and Rhythm: Normal rate and regular rhythm. Heart sounds: Normal heart sounds. No murmur heard. Pulmonary:      Effort: Pulmonary effort is normal. No respiratory distress. Breath sounds: Normal breath sounds. No stridor. No decreased breath sounds or wheezing. Abdominal:      General: There is no distension. Palpations: Abdomen is soft. Abdomen is not rigid. Tenderness: There is no abdominal tenderness. There is no guarding. Musculoskeletal:         General: Normal range of motion. Cervical back: Normal range of motion and neck supple. No rigidity. Lymphadenopathy:      Cervical: No cervical adenopathy. Skin:     General: Skin is warm and dry. Coloration: Skin is not pale. Findings: No rash. Neurological:      Mental Status: He is alert and oriented to person, place, and time. GCS: GCS eye subscore is 4. GCS verbal subscore is 5. GCS motor subscore is 6. Gait: Gait normal.      Comments: No gross deficits observed   Psychiatric:         Attention and Perception: Attention normal.         Mood and Affect: Mood normal.         Speech: Speech normal.         Behavior: Behavior normal. Behavior is cooperative. Thought Content: Thought content does not include homicidal or suicidal ideation. Thought content does not include homicidal or suicidal plan.          DIFFERENTIAL DIAGNOSIS:   Including but not limited to: Schizophrenia, medication noncompliance, adverse drug reaction to medication    DIAGNOSTIC RESULTS     EKG: All [facility]'s [facility] who either signs or Co-signs this chart in the absence of a cardiologist.  None    RADIOLOGY: non-plain film images(s) such as CT,Ultrasound and MRI are read by the radiologist.  Plain radiographic images are visualized and preliminarily interpreted by the emergency physician unless otherwise stated below. No orders to display       LABS:   Labs Reviewed - No data to display    EMERGENCY DEPARTMENT COURSE:   Vitals:    Vitals:    02/21/22 0951   BP: 135/89   Pulse: 99   Resp: 20   Temp: 97.9 °F (36.6 °C)   TempSrc: Oral   SpO2: 98%   Weight: 150 lb (68 kg)   Height: 5' 9\" (1.753 m)       Patient was seen in the emergency department during the global pandemic, when there was surge capacity and regional healthcare crisis. MDM:  The patient was seen in emergency room by me for concern for adverse drug reaction to a new psychiatric medication. Old records were reviewed. Physical exam revealed a pleasant and cooperative 42-year-old gentleman. Patient reported he did not like being \"forced\" to take medication. Labs were not deemed necessary as patient was not suicidal or homicidal.. The patient was thoroughly evaluated by Monico from [facility], who spoke with provider at [facility] that brought the patient to the ER. Previously patient was probated and evaluated for being admitted at  [facility] institution which was ultimately canceled due to the inclement weather. Patient prefers to go to [facility] but meets no criteria for admission there at this point. Monico was unable to consult Dr. Arleen Copeland as he was currently on a flight. The patient was deemed appropriate for discharge as he pose no threat to himself or others. Patient encouraged to be compliant with his medication.   Patient was discharged back to [facility] with strict return precautions and follow up instructions. Patient advised to follow-up with his psychiatrist Dr. Adwoa Fitzpatrick as scheduled this week. All questions were addressed. CRITICAL CARE:   None    CONSULTS:  Monico ([facility])    PROCEDURES:  None    FINAL IMPRESSION      1. Adverse effect of drug, initial encounter    2. Schizophrenia, unspecified type ([facility]          DISPOSITION/PLAN     1. Adverse effect of drug, initial encounter    2.  Schizophrenia, unspecified type [facility])        PATIENT REFERRED TO:  Christopher Spicer MD  [address] [facility]  [address] [facility]  [phone]      As scheduled this Friday, sooner if worse      DISCHARGE MEDICATIONS:  New Prescriptions    No medications on file       (Please note that portions of this note were completed with a voice recognition program.  Efforts were made to edit the dictations but occasionally words are mis-transcribed.)    Evelyne Fowler PA-C 02/21/22 12:38 PM    BRYANT Floyd PA-C  02/21/22 [id number]

## 2022-02-21 NOTE — ED TRIAGE NOTES
Pt presents to the ER ambulatory from [facility] requesting a mental health evaluation and medication change. Pt states he recently started taking Vraylar and it was making him feel \"very weak and did not enjoy it. \" Pt denies suicidal and homicidal ideation. Pt states he is having some pain in his arms. Pt placed in safe room for continuous monitoring by [employer]. Pt's belongings were placed in a locker and pt was changed into our scrubs.

## 2022-06-22 NOTE — ED NOTES
[facility] called and stated Cosme Aguirre do not have enough man power tonight to do the transfer\" and they suggest we call after 0830.       Nimco Barahona, [facility]  02/03/22 [id number]
